# Patient Record
Sex: FEMALE | Race: WHITE | NOT HISPANIC OR LATINO | Employment: FULL TIME | URBAN - METROPOLITAN AREA
[De-identification: names, ages, dates, MRNs, and addresses within clinical notes are randomized per-mention and may not be internally consistent; named-entity substitution may affect disease eponyms.]

---

## 2017-04-03 ENCOUNTER — OFFICE VISIT (OUTPATIENT)
Dept: URGENT CARE | Facility: URGENT CARE | Age: 22
End: 2017-04-03
Payer: COMMERCIAL

## 2017-04-03 VITALS
SYSTOLIC BLOOD PRESSURE: 116 MMHG | BODY MASS INDEX: 24.79 KG/M2 | OXYGEN SATURATION: 97 % | DIASTOLIC BLOOD PRESSURE: 70 MMHG | HEART RATE: 124 BPM | WEIGHT: 149 LBS | TEMPERATURE: 98.4 F

## 2017-04-03 DIAGNOSIS — K11.20 SALIVARY GLAND INFECTION: Primary | ICD-10-CM

## 2017-04-03 PROCEDURE — 99203 OFFICE O/P NEW LOW 30 MIN: CPT | Performed by: FAMILY MEDICINE

## 2017-04-03 NOTE — MR AVS SNAPSHOT
"              After Visit Summary   4/3/2017    Dionna Barger    MRN: [id number]           Patient Information     Date Of Birth          1995        Visit Information        Provider Department      4/3/2017 7:45 PM Shital Molina MD [facility]        Today's Diagnoses     Salivary gland infection    -  1       Follow-ups after your visit        Who to contact     If you have questions or need follow up information about today's clinic visit or your schedule please contact [facility] directly at [phone].  Normal or non-critical lab and imaging results will be communicated to you by [employer], letter or phone within 4 business days after the clinic has received the results. If you do not hear from us within 7 days, please contact the clinic through [employer] or phone. If you have a critical or abnormal lab result, we will notify you by phone as soon as possible.  Submit refill requests through [employer] or call your pharmacy and they will forward the refill request to us. Please allow 3 business days for your refill to be completed.          Additional Information About Your Visit        MyChart Information     [employer] lets you send messages to your doctor, view your test results, renew your prescriptions, schedule appointments and more. To sign up, go to [email or web address] . Click on \"Log in\" on the left side of the screen, which will take you to the Welcome page. Then click on \"Sign up Now\" on the right side of the page.     You will be asked to enter the access code listed below, as well as some personal information. Please follow the directions to create your username and password.     Your access code is: [email or web address]  Expires: 2017  8:52 PM     Your access code will  in 90 days. If you need help or a new code, please call your [place] clinic or [phone].        Care EveryWhere ID     This is your Care EveryWhere ID. This " could be used by other organizations to access your [place] medical records  [id number]        Your Vitals Were     Pulse Temperature Pulse Oximetry BMI (Body Mass Index)          124 98.4  F (36.9  C) (Oral) 97% 24.79 kg/m2         Blood Pressure from Last 3 Encounters:   04/03/17 116/70   11/10/14 118/85   11/02/14 115/66    Weight from Last 3 Encounters:   04/03/17 149 lb (67.6 kg) (79 %)*   11/10/14 141 lb (64 kg) (78 %)*   10/30/14 135 lb (61.2 kg) (71 %)*     * Growth percentiles are based on [facility] 2-20 Years data.              Today, you had the following     No orders found for display         Today's Medication Changes          These changes are accurate as of: 4/3/17  8:52 PM.  If you have any questions, ask your nurse or doctor.               Start taking these medicines.        Dose/Directions    amoxicillin-clavulanate 875-125 MG per tablet   Commonly known as:  AUGMENTIN   Used for:  Salivary gland infection   Started by:  Shital Molina MD        Dose:  1 tablet   Take 1 tablet by mouth 2 times daily   Quantity:  20 tablet   Refills:  0            Where to get your medicines      Some of these will need a paper prescription and others can be bought over the counter.  Ask your nurse if you have questions.     Bring a paper prescription for each of these medications     amoxicillin-clavulanate 875-125 MG per tablet                Primary Care Provider Office Phone # Fax #    Ashleigh Guido [phone]       [facility] [address] [facility] [address]        Thank you!     Thank you for choosing [facility]  for your care. Our goal is always to provide you with excellent care. Hearing back from our patients is one way we can continue to improve our services. Please take a few minutes to complete the written survey that you may receive in the mail after your visit with us. Thank you!             Your Updated Medication List - Protect  others around you: Learn how to safely use, store and throw away your medicines at www.disposemymeds.org.          This list is accurate as of: 4/3/17  8:52 PM.  Always use your most recent med list.                   Brand Name Dispense Instructions for use    amoxicillin-clavulanate 875-125 MG per tablet    AUGMENTIN    20 tablet    Take 1 tablet by mouth 2 times daily       ondansetron 4 MG tablet    ZOFRAN    20 tablet    Take 1 tablet (4 mg) by mouth every 8 hours as needed for nausea       order for DME     1 each    Equipment being ordered: Quad Cane () Treatment Diagnosis: impaired gait       oxyCODONE 5 MG IR tablet    ROXICODONE    30 tablet    Take 1-2 tablets (5-10 mg) by mouth every 3 hours as needed for moderate to severe pain       traMADol 50 MG tablet    ULTRAM    15 tablet    Take 1 tablet (50 mg) by mouth every 6 hours as needed for moderate pain       traZODone 50 MG tablet    DESYREL    20 tablet    Take 1 tablet (50 mg) by mouth At Bedtime For insomnia.

## 2017-04-04 NOTE — PROGRESS NOTES
SUBJECTIVE:  Dionna Barger, a 21 year old female scheduled an appointment to discuss the following issues:  Salivary gland infection     She presents with rt parotid swelling with some tenderness for last 3 days   Has no systemic symptoms     Past Medical History:   Diagnosis Date     Reflux, vesicoureteral       Past Surgical History:   Procedure Laterality Date     ARTHROSCOPIC RECONSTRUCTION ANTERIOR CRUCIATE LIGAMENT Right 11/10/2014    Procedure: ARTHROSCOPIC RECONSTRUCTION ANTERIOR CRUCIATE LIGAMENT;  Surgeon: Toribio Reynaga MD;  Location: [facility] OR     C REPAIR OF URETER  8/22/08    deflux         Social History     Social History     Marital status: Single     Spouse name: N/A     Number of children: N/A     Years of education: N/A     Occupational History     Not on file.     Social History Main Topics     Smoking status: Passive Smoke Exposure - Never Smoker     Smokeless tobacco: Not on file      Comment: mom and dad smoke outside     Alcohol use Not on file     Drug use: Not on file     Sexual activity: No     Other Topics Concern     Not on file     Social History Narrative        Current Outpatient Prescriptions   Medication Sig Dispense Refill     amoxicillin-clavulanate (AUGMENTIN) 875-125 MG per tablet Take 1 tablet by mouth 2 times daily 20 tablet 0     oxyCODONE (ROXICODONE) 5 MG immediate release tablet Take 1-2 tablets (5-10 mg) by mouth every 3 hours as needed for moderate to severe pain (Patient not taking: Reported on 4/3/2017) 30 tablet 0     traMADol (ULTRAM) 50 MG tablet Take 1 tablet (50 mg) by mouth every 6 hours as needed for moderate pain (Patient not taking: Reported on 4/3/2017) 15 tablet 0     ondansetron (ZOFRAN) 4 MG tablet Take 1 tablet (4 mg) by mouth every 8 hours as needed for nausea (Patient not taking: Reported on 4/3/2017) 20 tablet 0     traZODone (DESYREL) 50 MG tablet Take 1 tablet (50 mg) by mouth At Bedtime For insomnia. (Patient not taking: Reported on 4/3/2017)  20 tablet 0     ORDER FOR DME Equipment being ordered: Quad Cane ()  Treatment Diagnosis: impaired gait (Patient not taking: Reported on 4/3/2017) 1 each 0       Health Maintenance   Topic Date Due     INFLUENZA VACCINE (SYSTEM ASSIGNED)  09/01/2017     TETANUS IMMUNIZATION (SYSTEM ASSIGNED)  12/30/2018     PEDS DTAP/TDAP (7 - Td) 12/30/2018     HPV IMMUNIZATION  Completed        ROS:  CONSTITUTIONAL:no fever, no chills or sweats, no excessive fatigue, no significant change in weight  CV: neg   RESP -neg  GI:  Neg   NEURO: neg   MSK - neg   Skin - neg   Pyschiatry-neg     OBJECTIVE:  /70 (BP Location: Left arm, Patient Position: Chair, Cuff Size: Adult Regular)  Pulse 124  Temp 98.4  F (36.9  C) (Oral)  Wt 149 lb (67.6 kg)  SpO2 97%  BMI 24.79 kg/m2      EXAM:  GENERAL APPEARANCE: healthy, alert and no distress  EYES: EOMI,  PERRL  HENT: ear canals and TM's normal and nose and mouth without ulcers but there is redness in the rt lower jaw area just at the region of the premolar tooth   There is diffuse swelling of the rt parotid gland overlying the rt lower jaw and measuring about 6 cm by 4 cm with no acute tenderness   RESP: lungs clear to auscultation - no rales, rhonchi or wheezes  CV: regular rates and rhythm, normal S1 S2, no S3 or S4 and no murmur, click or rub -  ABDOMEN:  soft, nontender, no HSM or masses and bowel sounds normal  SKIN: no suspicious lesions or rashes  LYMPHATICS: No cervical nodes       ASSESSMENT/PLAN:  Chinyere was seen today for facial swelling.    Diagnoses and all orders for this visit:    Salivary gland infection  -     amoxicillin-clavulanate (AUGMENTIN) 875-125 MG per tablet; Take 1 tablet by mouth 2 times daily      Advised her to suck on lemon drops to help salivary gland secretion and drainage and help with the swelling   Can do nsaids too      Follow up if  symptoms fail to improve or worsens   Pt understood and agreed with plan             Shital Molina MD

## 2017-04-04 NOTE — NURSING NOTE
"Chief Complaint   Patient presents with     Facial Swelling     swelling in right side of face x 2 days        Initial /70 (BP Location: Left arm, Patient Position: Chair, Cuff Size: Adult Regular)  Pulse 124  Temp 98.4  F (36.9  C) (Oral)  Wt 149 lb (67.6 kg)  SpO2 97%  BMI 24.79 kg/m2 Estimated body mass index is 24.79 kg/(m^2) as calculated from the following:    Height as of 10/30/14: 5' 5\" (1.651 m).    Weight as of this encounter: 149 lb (67.6 kg).  Medication Reconciliation: complete  "

## 2018-07-14 ENCOUNTER — HEALTH MAINTENANCE LETTER (OUTPATIENT)
Age: 23
End: 2018-07-14

## 2019-10-18 ENCOUNTER — OFFICE VISIT (OUTPATIENT)
Dept: INTERNAL MEDICINE | Facility: CLINIC | Age: 24
End: 2019-10-18
Payer: COMMERCIAL

## 2019-10-18 VITALS
HEIGHT: 65 IN | TEMPERATURE: 99.5 F | WEIGHT: 157.9 LBS | OXYGEN SATURATION: 99 % | HEART RATE: 97 BPM | DIASTOLIC BLOOD PRESSURE: 64 MMHG | SYSTOLIC BLOOD PRESSURE: 112 MMHG | BODY MASS INDEX: 26.31 KG/M2

## 2019-10-18 DIAGNOSIS — Z12.4 SCREENING FOR MALIGNANT NEOPLASM OF CERVIX: ICD-10-CM

## 2019-10-18 DIAGNOSIS — Z00.00 ROUTINE GENERAL MEDICAL EXAMINATION AT A HEALTH CARE FACILITY: Primary | ICD-10-CM

## 2019-10-18 DIAGNOSIS — Z11.1 SCREENING EXAMINATION FOR PULMONARY TUBERCULOSIS: ICD-10-CM

## 2019-10-18 DIAGNOSIS — Z11.3 SCREEN FOR STD (SEXUALLY TRANSMITTED DISEASE): ICD-10-CM

## 2019-10-18 PROCEDURE — 90715 TDAP VACCINE 7 YRS/> IM: CPT | Performed by: PHYSICIAN ASSISTANT

## 2019-10-18 PROCEDURE — 86780 TREPONEMA PALLIDUM: CPT | Performed by: PHYSICIAN ASSISTANT

## 2019-10-18 PROCEDURE — 86803 HEPATITIS C AB TEST: CPT | Performed by: PHYSICIAN ASSISTANT

## 2019-10-18 PROCEDURE — 87591 N.GONORRHOEAE DNA AMP PROB: CPT | Performed by: PHYSICIAN ASSISTANT

## 2019-10-18 PROCEDURE — 87491 CHLMYD TRACH DNA AMP PROBE: CPT | Performed by: PHYSICIAN ASSISTANT

## 2019-10-18 PROCEDURE — 86580 TB INTRADERMAL TEST: CPT | Performed by: PHYSICIAN ASSISTANT

## 2019-10-18 PROCEDURE — 36415 COLL VENOUS BLD VENIPUNCTURE: CPT | Performed by: PHYSICIAN ASSISTANT

## 2019-10-18 PROCEDURE — G0145 SCR C/V CYTO,THINLAYER,RESCR: HCPCS | Performed by: PHYSICIAN ASSISTANT

## 2019-10-18 PROCEDURE — 99385 PREV VISIT NEW AGE 18-39: CPT | Mod: 25 | Performed by: PHYSICIAN ASSISTANT

## 2019-10-18 PROCEDURE — 90471 IMMUNIZATION ADMIN: CPT | Performed by: PHYSICIAN ASSISTANT

## 2019-10-18 PROCEDURE — 87389 HIV-1 AG W/HIV-1&-2 AB AG IA: CPT | Performed by: PHYSICIAN ASSISTANT

## 2019-10-18 SDOH — HEALTH STABILITY: MENTAL HEALTH: HOW OFTEN DO YOU HAVE A DRINK CONTAINING ALCOHOL?: 2-3 TIMES A WEEK

## 2019-10-18 SDOH — HEALTH STABILITY: MENTAL HEALTH: HOW MANY STANDARD DRINKS CONTAINING ALCOHOL DO YOU HAVE ON A TYPICAL DAY?: 1 OR 2

## 2019-10-18 ASSESSMENT — MIFFLIN-ST. JEOR: SCORE: 1477.11

## 2019-10-18 NOTE — PROGRESS NOTES
SUBJECTIVE:   CC: Dionna Barger is an 24 year old woman who presents for preventive health visit.     Healthy Habits:     Getting at least 3 servings of Calcium per day:  Yes    Bi-annual eye exam:  NO    Dental care twice a year:  NO    Sleep apnea or symptoms of sleep apnea:  None    Diet:  Regular (no restrictions)    Frequency of exercise:  2-3 days/week    Duration of exercise:  15-30 minutes    Taking medications regularly:  Yes    Medication side effects:  None    PHQ-2 Total Score: 0    Additional concerns today:  No      Today's PHQ-2 Score:   PHQ-2 ( 1999 [employer]) 10/18/2019   Q1: Little interest or pleasure in doing things 0   Q2: Feeling down, depressed or hopeless 0   PHQ-2 Score 0   Q1: Little interest or pleasure in doing things Not at all   Q2: Feeling down, depressed or hopeless Not at all   PHQ-2 Score 0       Abuse: Current or Past(Physical, Sexual or Emotional)- NO  Do you feel safe in your environment? YES    Social History     Tobacco Use     Smoking status: Never Smoker     Smokeless tobacco: Never Used     Tobacco comment: mom and dad smoke outside   Substance Use Topics     Alcohol use: Yes     Frequency: 2-3 times a week     Drinks per session: 1 or 2     Comment: occ         Alcohol Use 10/18/2019   Prescreen: >3 drinks/day or >7 drinks/week? No   Prescreen: >3 drinks/day or >7 drinks/week? -   No flowsheet data found.    Reviewed orders with patient.  Reviewed health maintenance and updated orders accordingly - Yes    Mammogram not appropriate for this patient based on age.    Pertinent mammograms are reviewed under the imaging tab.  History of abnormal Pap smear: NO - age 21-29 PAP every 3 years recommended     Reviewed and updated as needed this visit by clinical staff  Tobacco  Allergies  Meds  Med Hx  Surg Hx  Fam Hx  Soc Hx        Reviewed and updated as needed this visit by Provider  Tobacco  Surg Hx  Fam Hx  Soc Hx         Review of Systems  CONSTITUTIONAL:  "NEGATIVE for fever, chills, change in weight  INTEGUMENTARU/SKIN: NEGATIVE for worrisome rashes, moles or lesions  EYES: NEGATIVE for vision changes or irritation  ENT: NEGATIVE for ear, mouth and throat problems  RESP: NEGATIVE for significant cough or SOB  BREAST: NEGATIVE for masses, tenderness or discharge  CV: NEGATIVE for chest pain, palpitations or peripheral edema  GI: NEGATIVE for nausea, abdominal pain, heartburn, or change in bowel habits  : NEGATIVE for unusual urinary or vaginal symptoms. Periods are regular.  MUSCULOSKELETAL: NEGATIVE for significant arthralgias or myalgia  NEURO: NEGATIVE for weakness, dizziness or paresthesias  PSYCHIATRIC: NEGATIVE for changes in mood or affect     OBJECTIVE:   /64   Pulse 97   Temp 99.5  F (37.5  C) (Temporal)   Ht 1.651 m (5' 5\")   Wt 71.6 kg (157 lb 14.4 oz)   LMP 09/19/2019   SpO2 99%   Breastfeeding? No   BMI 26.28 kg/m    Physical Exam  GENERAL: healthy, alert and no distress  EYES: Eyes grossly normal to inspection, PERRL and conjunctivae and sclerae normal  HENT: ear canals and TM's normal, nose and mouth without ulcers or lesions  NECK: no adenopathy, no asymmetry, masses, or scars and thyroid normal to palpation  RESP: lungs clear to auscultation - no rales, rhonchi or wheezes  CV: regular rate and rhythm, normal S1 S2, no S3 or S4, no murmur, click or rub, no peripheral edema and peripheral pulses strong  ABDOMEN: soft, nontender, no hepatosplenomegaly, no masses and bowel sounds normal   (female): normal female external genitalia, normal urethral meatus, vaginal mucosa pink, moist, well rugated, and normal cervix/adnexa/uterus without masses or discharge  MS: no gross musculoskeletal defects noted, no edema  SKIN: no suspicious lesions or rashes  NEURO: Normal strength and tone, mentation intact and speech normal  PSYCH: mentation appears normal, affect normal/bright    Diagnostic Test Results:  Labs reviewed in Epic    ASSESSMENT/PLAN: "   1. Routine general medical examination at a health care facility  - reviewed PMH and health maintenance     2. Screening for malignant neoplasm of cervix  - Pap imaged thin layer screen only - recommended age 21 - 24 years    3. Screen for STD (sexually transmitted disease)  - NEISSERIA GONORRHOEA PCR  - Treponema Abs w Reflex to RPR and Titer  - HIV Antigen Antibody Combo  - Hepatitis C Screen Reflex to HCV RNA Quant and Genotype  - CHLAMYDIA TRACHOMATIS PCR    4. Screening examination for pulmonary tuberculosis  - TB INTRADERMAL TEST    Geovanna Serrano PA-C  [facility]

## 2019-10-18 NOTE — NURSING NOTE
"Chief Complaint   Patient presents with     Physical     discuss sti screenings and pap     /64   Pulse 97   Temp 99.5  F (37.5  C) (Temporal)   Ht 1.651 m (5' 5\")   Wt 71.6 kg (157 lb 14.4 oz)   LMP 09/19/2019   SpO2 99%   Breastfeeding? No   BMI 26.28 kg/m   Estimated body mass index is 26.28 kg/m  as calculated from the following:    Height as of this encounter: 1.651 m (5' 5\").    Weight as of this encounter: 71.6 kg (157 lb 14.4 oz).        Health Maintenance due pending provider review:  Pap Smear and Chlamydia screening    PAP--Possibly completing today, per Provider review    Noris Suarez CMA  "

## 2019-10-19 LAB — T PALLIDUM AB SER QL: NONREACTIVE

## 2019-10-20 LAB
C TRACH DNA SPEC QL NAA+PROBE: NEGATIVE
N GONORRHOEA DNA SPEC QL NAA+PROBE: NEGATIVE
SPECIMEN SOURCE: NORMAL
SPECIMEN SOURCE: NORMAL

## 2019-10-21 ENCOUNTER — ALLIED HEALTH/NURSE VISIT (OUTPATIENT)
Dept: NURSING | Facility: CLINIC | Age: 24
End: 2019-10-21
Payer: COMMERCIAL

## 2019-10-21 DIAGNOSIS — Z11.1 SCREENING EXAMINATION FOR PULMONARY TUBERCULOSIS: Primary | ICD-10-CM

## 2019-10-21 LAB
HCV AB SERPL QL IA: NONREACTIVE
HIV 1+2 AB+HIV1 P24 AG SERPL QL IA: NONREACTIVE
PPDINDURATION: 0 MM (ref 0–5)
PPDREDNESS: 0 MM

## 2019-10-21 NOTE — PROGRESS NOTES
Mantoux results: No induration.  No swelling.  No redness.    Copy of results given to patient.    Shiela Hughes RN on 10/21/2019 at 9:21 AM

## 2019-10-22 LAB
COPATH REPORT: NORMAL
PAP: NORMAL

## 2020-09-09 ENCOUNTER — OFFICE VISIT (OUTPATIENT)
Dept: INTERNAL MEDICINE | Facility: CLINIC | Age: 25
End: 2020-09-09
Payer: COMMERCIAL

## 2020-09-09 VITALS
BODY MASS INDEX: 24.68 KG/M2 | HEIGHT: 66 IN | SYSTOLIC BLOOD PRESSURE: 114 MMHG | HEART RATE: 94 BPM | WEIGHT: 153.6 LBS | RESPIRATION RATE: 16 BRPM | OXYGEN SATURATION: 99 % | TEMPERATURE: 98.6 F | DIASTOLIC BLOOD PRESSURE: 60 MMHG

## 2020-09-09 DIAGNOSIS — Z32.00 PREGNANCY EXAMINATION OR TEST, PREGNANCY UNCONFIRMED: Primary | ICD-10-CM

## 2020-09-09 LAB — B-HCG SERPL-ACNC: 114 IU/L (ref 0–5)

## 2020-09-09 PROCEDURE — 84702 CHORIONIC GONADOTROPIN TEST: CPT | Performed by: PHYSICIAN ASSISTANT

## 2020-09-09 PROCEDURE — 99213 OFFICE O/P EST LOW 20 MIN: CPT | Performed by: PHYSICIAN ASSISTANT

## 2020-09-09 PROCEDURE — 36415 COLL VENOUS BLD VENIPUNCTURE: CPT | Performed by: PHYSICIAN ASSISTANT

## 2020-09-09 ASSESSMENT — MIFFLIN-ST. JEOR: SCORE: 1472.45

## 2020-09-09 NOTE — PROGRESS NOTES
"Subjective     Dionna Barger is a 25 year old female who presents to clinic today for the following health issues:    HPI       Patient just wants to confirm pregnancy. She has had multiple positive test results at home. She notes she is excited and plans to continue with pregnancy. She has had no previous pregnancies.   LMP: August 11th   - taking a prenatal   - a cup of caffeine daily  - no alcohol or tobacco use         Objective    /60   Pulse 94   Temp 98.6  F (37  C) (Tympanic)   Resp 16   Ht 1.683 m (5' 6.25\")   Wt 69.7 kg (153 lb 9.6 oz)   LMP 08/11/2020   SpO2 99%   BMI 24.60 kg/m    Body mass index is 24.6 kg/m .  Physical Exam   GENERAL: healthy, alert and no distress    No results found for this or any previous visit (from the past 24 hour(s)).        Assessment & Plan     Pregnancy examination or test, pregnancy unconfirmed  - pt requests blood test, will order for confirmation   - reviewed pregnancy education and guidelines   - referral to OB as below   - HCG quantitative pregnancy  - OB/GYN REFERRAL     No follow-ups on file.    Geovanna Serrano PA-C  [facility]    "

## 2020-10-06 DIAGNOSIS — O36.80X0 PREGNANCY WITH INCONCLUSIVE FETAL VIABILITY: Primary | ICD-10-CM

## 2020-10-06 NOTE — PROGRESS NOTES
Pt has NOB scheduled with ultrasound. Needed order to be placed. Future ultrasound order placed and linked with appt. Closing encounter.   Jazzmine Maldonado RN on 10/6/2020 at 4:28 PM

## 2020-10-07 ENCOUNTER — PRENATAL OFFICE VISIT (OUTPATIENT)
Dept: NURSING | Facility: CLINIC | Age: 25
End: 2020-10-07
Payer: COMMERCIAL

## 2020-10-07 DIAGNOSIS — Z13.79 GENETIC SCREENING: Primary | ICD-10-CM

## 2020-10-07 DIAGNOSIS — Z34.01 ENCOUNTER FOR SUPERVISION OF NORMAL FIRST PREGNANCY IN FIRST TRIMESTER: ICD-10-CM

## 2020-10-07 PROBLEM — Z34.00 SUPERVISION OF NORMAL FIRST PREGNANCY: Status: ACTIVE | Noted: 2020-10-07

## 2020-10-07 PROCEDURE — 99207 PR NO CHARGE NURSE ONLY: CPT

## 2020-10-07 RX ORDER — VITAMIN A ACETATE, .BETA.-CAROTENE, ASCORBIC ACID, CHOLECALCIFEROL, .ALPHA.-TOCOPHEROL ACETATE, DL-, THIAMINE MONONITRATE, RIBOFLAVIN, NIACINAMIDE, PYRIDOXINE HYDROCHLORIDE, FOLIC ACID, CYANOCOBALAMIN, CALCIUM CARBONATE, FERROUS FUMARATE, ZINC OXIDE, AND CUPRIC OXIDE 2000; 2000; 120; 400; 22; 1.84; 3; 20; 10; 1; 12; 200; 27; 25; 2 [IU]/1; [IU]/1; MG/1; [IU]/1; MG/1; MG/1; MG/1; MG/1; MG/1; MG/1; UG/1; MG/1; MG/1; MG/1; MG/1
1 TABLET ORAL DAILY
COMMUNITY

## 2020-10-07 NOTE — PROGRESS NOTES
SUBJECTIVE:     HPI:    This is a 25 year old female patient,  who presents for her first obstetrical visit.    KARLA: 2021, by Last Menstrual Period.  She is 8w1d weeks at the time of the nurse visit on 10/7/20.  Her cycles are regular.  Her last menstrual period was normal.   Since her LMP, she has experienced  nausea and cramping and bloating).   She denies emesis, abdominal pain, fatigue, headache, loss of appetite, vaginal discharge, dysuria, pelvic pain, urinary urgency, lightheadedness, urinary frequency, vaginal bleeding, hemorrhoids and constipation.    Additional History: First pregnancy      Have you travelled during the pregnancy?No  Have your sexual partner(s) travelled during the pregnancy?No      HISTORY:   Planned Pregnancy: No, but excited  Marital Status: Single  Occupation:  [employer]  Living in Household: Significant Other and Parents/Siblings.  Lives with Jt' parents    Past History:  Her past medical history is non-contributory.      She has a history of  none    Since her last LMP she denies use of alcohol, tobacco and street drugs.    Past medical, surgical, social and family history were reviewed and updated in [employer].        Current Outpatient Medications   Medication     Prenatal Vit-Fe Fumarate-FA (PNV PRENATAL PLUS MULTIVITAMIN) 27-1 MG TABS per tablet     No current facility-administered medications for this visit.        ROS:   12 point review of systems negative other than symptoms noted below or in the HPI.  Gastrointestinal: Bloating and Nausea  Genitourinary: Cramps     Nurse phone visit completed. Prenatal book and folder (containing standard educational hand-outs and brochures) sent to patient. Information in folder reviewed over the phone. Questions answered. Brochure given on optional screening available to assess chromosomal anomalies. Pt desires NIPT. Pt advised to call the clinic if she has any questions or concerns related to her pregnancy. Prenatal  labs future ordered. New prenatal visit scheduled on 10/21/20 with Kandy Barfield CNM.      Lab Results   Component Value Date    PAP NIL 10/18/2019     PHQ-9 score:    PHQ 10/6/2020   PHQ-9 Total Score 0   Q9: Thoughts of better off dead/self-harm past 2 weeks Not at all     VENITA-7 SCORE 10/6/2020   Total Score 0 (minimal anxiety)   Total Score 0   Sri Goff RN on 10/7/2020 at 1:21 PM    [employer]

## 2020-10-19 NOTE — PATIENT INSTRUCTIONS
Thank you for coming to see the Midwives at the   [facility]!      Please take prenatal vitamin with DHA and vitamin D3 4,000-5,000 international unit(s) once daily during the entire pregnancy.      We will notify you about your labs that were drawn today once we get the results back.  If you have MyChart your lab results will be posted there.      Someone from the clinic will call you personally or send you a StyleSeek message with your results.      If you need any refills of medications please call your pharmacy and they will contact us.      If you have a medical emergency please call 911.      If you have any concerns about today's visit, wish to schedule another appointment, or have an urgent medical concern please call our office at [phone]. You can also make appointments through StyleSeek.      After hours you may also call the clinic number above to be connected with [place]'s after hours triage nurse.  The nurse can page the midwife on call if needed. There is always a midwife on call 24 hours a day.    Prenatal Care Recommendations:    Before 14 weeks: Dating ultrasound, genetic testing       This ultrasound helps us determine your dates accurately. Innatal (genetic screening test) can be drawn anytime after 10 weeks of gestation.    16 weeks: Optional genetic testing single AFP       This testing helps understand your baby's risk for some genetic abnormalities.    18-22 weeks:  Screening anatomy ultrasound       This testing will look for early growth abnormalities, placenta location, and may tell the baby's gender if you wish to find out.    24-27 weeks: One hour diabetes test (GCT) and complete blood count       This test helps identify diabetes of pregnancy or gestational diabetes.  We also look   at the iron in your blood and how well your blood clots.    28 - 36 weeks: Tetanus shot (Tdap)       This shot helps protect you and your baby from whooping cough.    36 weeks and  later: Group B Strep test (GBS)       This test helps predict if you need antibiotics in labor to prevent infection for your baby.    Anytime September to April:  Flu shot       This shot helps protect you and your family from the flu.  This is especially important during pregnancy.        The typical schedule after your first visit today you can expect:     Visit 2 - 12-16 weeks  Visit 3 - 20 weeks  Visit 4 - 24 weeks  Visit 5 - 28 weeks  Visit 6 - 30 weeks  Visit 7 - 32 weeks  Visit 8 - 34 weeks  Visit 9 - 36 weeks  Weekly after 36 weeks until delivery.        Any time during or after your pregnancy you may experience increased depression and/or mood changes.    We are here to support you. Please contact us if you are:    Feeling anxious    Overwhelmed or sad     Trouble sleeping    Crying uncontrollably    Trouble caring for yourself or baby.    Any thoughts of hurting yourself, your baby, or anyone else    If anything comes up between your visits or you have concerns please don't hesitate to contact us.    Secure access to your medical record:  Use [employer] (secure email communication and access to your chart) to send your primary care provider a message or make an appointment. Ask someone on your Team how to sign up for [employer]. To log on to [employer] or for more information in [employer] please visit the website at [email or web address].       Certified Nurse Midwife (CNM) Team    EJ Ngo CNM Melissa Kitzman APRN, CNM, [facility]  BOBO Hunt DNP, BOOB Benson DNP, EJ      Again, thank you for choosing the midwives at [facility].  We are excited to be a part of your pregnancy. Please let us know how we can best partner with you to improve your and your family's health.    Remedies for nausea and vomiting with pregnancy      Eat small frequent meals every 2-3 hours if possible.       Avoid food at extremes of temperature and  drinks with carbonation.      Eat foods that appeal to you, avoiding fats and spicy foods.      Avoid liquids with foods.  Drink liquids 30-60 minutes before or after eating and sip slowly.      Bread, pasta, crackers, potatoes, and rice tend to be tolerated the best.      Don't worry about what you eat in the first 3 months, it is more important that you can eat and keep it down.       Try flat ginger ale or ginger tea      Before rising in the morning, eat a small amount of crackers or dry toast.      Peppermint tea      Ginger is a herbal remedy for nausea and you can use it in any form.  There are ginger tablets you can purchase.  The dose 1000 mg a day in divided doses.       You may also try doxylamine (Unisom) 12.5 mg three times a day which is a sleeping medication along with Vitamin B6 25 mg three times a day.  This combination takes up to a week to work so give it some time.       Benadryl (diphenhydramine) 25-50 mg every 8 hour or Dramamine (dimenhydrinate)  mg by mouth every 4-6 hours. Both of these medication may cause some drowsiness      Other things that may help include an Accupressure band and acupuncture      If these methods fail there are many prescriptions that we can try    If you begin to vomit more than 5 or 6 times a day and feel that you are unable to keep anything down, call the [facility] at [phone]    Recommendations for total and rate of weight gain during pregnancy, by prepregnancy BMI    Total weight gain Rates of weight gain*  2nd and 3rd trimester   Prepregnancy BMI Range in kg Range in lbs Mean (range) in kg/week Mean (range) in lbs/week   Underweight (<18.5 kg/m2) 12.5-18 28-40 0.51 (0.44-0.58) 1 (1-1.3)   Normal weight (18.5-24.9 kg/m2) 11.5-16 25-35 0.42 (0.35-0.50) 1 (0.8-1)   Overweight (25.0-29.9 kg/m2) 7-11.5 15-25 0.28 (0.23-0.33) 0.6 (0.5-0.7)   Obese (?30.0 kg/m2) 5-9 11-20 0.22 (0.17-0.27) 0.5 (0.4-0.6)   BMI: body mass index.  *  Calculations assume a 0.5-2 kg (1.1-4.4 lbs) weight gain in the first trimester.  * To calculate BMI go to [email or web address]      Constipation    Constipation can be caused by many factors such as poor diet, lack of activity, and medications. Often times women experience more constipation during pregnancy due to decreased intestinal motility.    DRINK TONS OF WATER! 2.5 liters (4 pints) of water per day      Activity is very important. Increase your daily activity to at least 20-60 minutes. This increases blood flow to your gut and improves bowel function    Limit caffeine and alcohol intake    Avoid foods you've identified as constipating    Increase fiber intake: there are ways to increase you fiber through your diet but there are also OTC agents such as Metamucil or Benfiber that you can supplement your diet with    Use probiotics such as Florastor and/or prebiotics such as oligosaccharides    Consider using an Omega 3 supplement, flaxseed and amador seeds are great sources    Plan adequate time for elimination, it is most effective right after activity, and it may be beneficial to plan a consistent time daily    Food Suggestions      Eat beans, nuts, whole grain breads and cereals, oats, barley, figs, apples with skin, raisins, green leafy vegetables, fresh and dried fruits (especially grapes), prunes or prune juice    Eat popcorn nightly    Eat 2-3 salads per day    Add a pinch of cayenne pepper to food    1-2 tbsp of olive oil per day    Lemon juice and/or honey in warm water every morning before breakfast    Decrease red meat, refined white flour products like white rice, white bread and pasta    Tea infusions of: rosemary, chamomile, lemon balm, senna leaf, alfalfa, fennel seeds, lavender, cascara, dandelion, licorice root tea, psyllium seeds, marshmallow root    Other remedies      Increase Vitamin B and E (800 IU if not pregnant, 400 IU if pregnant per day) and potassium, calcium, and magnesium  supplements      If these remedies fail, medications are an option as well. Please talk with your midwife if you continue to struggle with constipation. If you are pregnant please double check with us before starting any medications!

## 2020-10-21 ENCOUNTER — PRENATAL OFFICE VISIT (OUTPATIENT)
Dept: MIDWIFE SERVICES | Facility: CLINIC | Age: 25
End: 2020-10-21
Payer: COMMERCIAL

## 2020-10-21 ENCOUNTER — ANCILLARY PROCEDURE (OUTPATIENT)
Dept: ULTRASOUND IMAGING | Facility: CLINIC | Age: 25
End: 2020-10-21
Payer: COMMERCIAL

## 2020-10-21 VITALS
HEIGHT: 66 IN | WEIGHT: 158.8 LBS | BODY MASS INDEX: 25.52 KG/M2 | SYSTOLIC BLOOD PRESSURE: 110 MMHG | DIASTOLIC BLOOD PRESSURE: 60 MMHG | HEART RATE: 92 BPM

## 2020-10-21 DIAGNOSIS — Z34.01 ENCOUNTER FOR SUPERVISION OF NORMAL FIRST PREGNANCY IN FIRST TRIMESTER: Primary | ICD-10-CM

## 2020-10-21 DIAGNOSIS — Z13.79 GENETIC SCREENING: ICD-10-CM

## 2020-10-21 DIAGNOSIS — O36.80X0 PREGNANCY WITH INCONCLUSIVE FETAL VIABILITY: ICD-10-CM

## 2020-10-21 LAB
ABO + RH BLD: NORMAL
ABO + RH BLD: NORMAL
ALBUMIN UR-MCNC: NEGATIVE MG/DL
APPEARANCE UR: CLEAR
BILIRUB UR QL STRIP: NEGATIVE
BLD GP AB SCN SERPL QL: NORMAL
BLOOD BANK CMNT PATIENT-IMP: NORMAL
COLOR UR AUTO: YELLOW
ERYTHROCYTE [DISTWIDTH] IN BLOOD BY AUTOMATED COUNT: 14.1 % (ref 10–15)
GLUCOSE UR STRIP-MCNC: NEGATIVE MG/DL
HBV SURFACE AG SERPL QL IA: NONREACTIVE
HCT VFR BLD AUTO: 37.2 % (ref 35–47)
HGB BLD-MCNC: 12.5 G/DL (ref 11.7–15.7)
HGB UR QL STRIP: NEGATIVE
HIV 1+2 AB+HIV1 P24 AG SERPL QL IA: NONREACTIVE
KETONES UR STRIP-MCNC: NEGATIVE MG/DL
LEUKOCYTE ESTERASE UR QL STRIP: NEGATIVE
MCH RBC QN AUTO: 28.2 PG (ref 26.5–33)
MCHC RBC AUTO-ENTMCNC: 33.6 G/DL (ref 31.5–36.5)
MCV RBC AUTO: 84 FL (ref 78–100)
NITRATE UR QL: NEGATIVE
PH UR STRIP: 7 PH (ref 5–7)
PLATELET # BLD AUTO: 216 10E9/L (ref 150–450)
RBC # BLD AUTO: 4.43 10E12/L (ref 3.8–5.2)
SOURCE: NORMAL
SP GR UR STRIP: 1.01 (ref 1–1.03)
SPECIMEN EXP DATE BLD: NORMAL
UROBILINOGEN UR STRIP-ACNC: 0.2 EU/DL (ref 0.2–1)
WBC # BLD AUTO: 6.5 10E9/L (ref 4–11)

## 2020-10-21 PROCEDURE — 86901 BLOOD TYPING SEROLOGIC RH(D): CPT | Performed by: ADVANCED PRACTICE MIDWIFE

## 2020-10-21 PROCEDURE — 76817 TRANSVAGINAL US OBSTETRIC: CPT | Performed by: OBSTETRICS & GYNECOLOGY

## 2020-10-21 PROCEDURE — 86900 BLOOD TYPING SEROLOGIC ABO: CPT | Performed by: ADVANCED PRACTICE MIDWIFE

## 2020-10-21 PROCEDURE — 99000 SPECIMEN HANDLING OFFICE-LAB: CPT | Performed by: ADVANCED PRACTICE MIDWIFE

## 2020-10-21 PROCEDURE — 87340 HEPATITIS B SURFACE AG IA: CPT | Performed by: ADVANCED PRACTICE MIDWIFE

## 2020-10-21 PROCEDURE — 87389 HIV-1 AG W/HIV-1&-2 AB AG IA: CPT | Performed by: ADVANCED PRACTICE MIDWIFE

## 2020-10-21 PROCEDURE — 86850 RBC ANTIBODY SCREEN: CPT | Performed by: ADVANCED PRACTICE MIDWIFE

## 2020-10-21 PROCEDURE — 99207 PR FIRST OB VISIT: CPT | Performed by: ADVANCED PRACTICE MIDWIFE

## 2020-10-21 PROCEDURE — 86762 RUBELLA ANTIBODY: CPT | Performed by: ADVANCED PRACTICE MIDWIFE

## 2020-10-21 PROCEDURE — 36415 COLL VENOUS BLD VENIPUNCTURE: CPT | Performed by: ADVANCED PRACTICE MIDWIFE

## 2020-10-21 PROCEDURE — 99N1100 PR STATISTIC VERIFI PRENATAL TRISOMY 21,18,13: Mod: 90 | Performed by: ADVANCED PRACTICE MIDWIFE

## 2020-10-21 PROCEDURE — 86780 TREPONEMA PALLIDUM: CPT | Mod: 90 | Performed by: ADVANCED PRACTICE MIDWIFE

## 2020-10-21 PROCEDURE — 81003 URINALYSIS AUTO W/O SCOPE: CPT | Performed by: ADVANCED PRACTICE MIDWIFE

## 2020-10-21 PROCEDURE — 85027 COMPLETE CBC AUTOMATED: CPT | Performed by: ADVANCED PRACTICE MIDWIFE

## 2020-10-21 PROCEDURE — 87088 URINE BACTERIA CULTURE: CPT | Performed by: ADVANCED PRACTICE MIDWIFE

## 2020-10-21 PROCEDURE — 87186 SC STD MICRODIL/AGAR DIL: CPT | Performed by: ADVANCED PRACTICE MIDWIFE

## 2020-10-21 PROCEDURE — 87086 URINE CULTURE/COLONY COUNT: CPT | Performed by: ADVANCED PRACTICE MIDWIFE

## 2020-10-21 ASSESSMENT — MIFFLIN-ST. JEOR: SCORE: 1496.03

## 2020-10-21 NOTE — PROGRESS NOTES
SUBJECTIVE:      HPI:     This is a 25 year old female patient,  who presents for her first obstetrical visit.     KARLA: 2021, by Last Menstrual Period.  She is 8w1d weeks at the time of the nurse visit on 10/7/20.  Her cycles are regular.  Her last menstrual period was normal.   Since her LMP, she has experienced  nausea and cramping and bloating).   She denies emesis, abdominal pain, fatigue, headache, loss of appetite, vaginal discharge, dysuria, pelvic pain, urinary urgency, lightheadedness, urinary frequency, vaginal bleeding, hemorrhoids and constipation.     Additional History: First pregnancy        Have you travelled during the pregnancy?No  Have your sexual partner(s) travelled during the pregnancy?No        HISTORY:   Planned Pregnancy: No, but excited  Marital Status: Single  Occupation:  [employer]  Living in Household: Significant Other and Parents/Siblings.  Lives with Jt' parents     Past History:  Her past medical history is non-contributory.       She has a history of  none     Since her last LMP she denies use of alcohol, tobacco and street drugs.     Past medical, surgical, social and family history were reviewed and updated in EPIC.               Current Outpatient Medications   Medication     Prenatal Vit-Fe Fumarate-FA (PNV PRENATAL PLUS MULTIVITAMIN) 27-1 MG TABS per tablet      No current facility-administered medications for this visit.          ROS:   12 point review of systems negative other than symptoms noted below or in the HPI.  Gastrointestinal: Bloating and Nausea  Genitourinary: Cramps      Nurse phone visit completed. Prenatal book and folder (containing standard educational hand-outs and brochures) sent to patient. Information in folder reviewed over the phone. Questions answered. Brochure given on optional screening available to assess chromosomal anomalies. Pt desires NIPT. Pt advised to call the clinic if she has any questions or concerns related  "to her pregnancy. Prenatal labs future ordered. New prenatal visit scheduled on 10/21/20 with Kandy Barfield CNM.              Lab Results   Component Value Date     PAP NIL 10/18/2019      PHQ-9 score:    PHQ   10/6/2020   PHQ-9 Total Score 0   Q9: Thoughts of better off dead/self-harm past 2 weeks Not at all      VENITA-7 SCORE 10/6/2020   Total Score 0 (minimal anxiety)   Total Score 0   Sri Goff RN on 10/7/2020 at 1:21 PM                OBJECTIVE:     EXAM:  /60   Pulse 92   Ht 1.683 m (5' 6.25\")   Wt 72 kg (158 lb 12.8 oz)   LMP 2020   BMI 25.44 kg/m   Body mass index is 25.44 kg/m .    GENERAL: healthy, alert and no distress  NECK: no adenopathy, no asymmetry, masses, or scars and thyroid normal to palpation  RESP: lungs clear to auscultation - no rales, rhonchi or wheezes  CV: regular rate and rhythm, normal S1 S2, no S3 or S4, no murmur, click or rub, no peripheral edema and peripheral pulses strong  ABDOMEN: soft, nontender, no hepatosplenomegaly, no masses and bowel sounds normal  MS: no gross musculoskeletal defects noted, no edema  SKIN: no suspicious lesions or rashes  NEURO: Normal strength and tone, mentation intact and speech normal  PSYCH: mentation appears normal, affect normal/bright    ASSESSMENT/PLAN:       ICD-10-CM    1. Encounter for supervision of normal first pregnancy in first trimester  Z34.01 *UA reflex to Microscopic     Urine Culture Aerobic Bacterial     Treponema Abs w Reflex to RPR and Titer     Rubella Antibody IgG Quantitative     HIV Antigen Antibody Combo     CBC with platelets     Hepatitis B surface antigen     ABO/Rh type and screen   2. Genetic screening  Z13.79 Non Invasive Prenatal Test Cell Free DNA       25 year old , On 2020 patient is 10w1d weeks of pregnancy with KARLA of 2021, by Last Menstrual Period     consult for US for AMA patients: NA  Genetic Testing reviewed and discussed, patient desires. Will have " drawn today with rest of labs.     COUNSELING    Instructed on use of triage nurse line and contacting the on call CNM after hours in an emergency.     Symptoms of N&V and fatigue usually start to resolve around 12-16 weeks     Reviewed CNM philosophy, call schedule for labor and delivery, and FSH for delivery    1st OB handout given outlining appointment spacing and CNM information    Reviewed exercise and nutrition    Recommend to gain 25-35 pounds with her pregnancy.    Discussed OTC medications. OB med list given    Encouraged patient to take PNV's/DHA    Travel precautions discussed, no air travel after 36 weeks     Will call patient with lab results when available    Does patient desire a RN home visit from the [facility]?  No    If yes, paperwork completed?  No     F/U to be addressed next visit:  Offer gc/ct    Will return to the clinic in 4 weeks for her next routine prenatal check (virtual or in person per Edi's preference.  Will call to be seen sooner if problems arise.    BOBO Small, CNM

## 2020-10-22 LAB
BACTERIA SPEC CULT: ABNORMAL
Lab: ABNORMAL
RUBV IGG SERPL IA-ACNC: 5 IU/ML
SPECIMEN SOURCE: ABNORMAL
T PALLIDUM AB SER QL: NONREACTIVE

## 2020-10-22 NOTE — RESULT ENCOUNTER NOTE
Asymptomatic bacteruria, awaiting sensitivities and will send antibitoics.     Kandy Barfield, BOBO, CNM

## 2020-10-23 DIAGNOSIS — O23.41 URINARY TRACT INFECTION IN MOTHER DURING FIRST TRIMESTER OF PREGNANCY: Primary | ICD-10-CM

## 2020-10-23 RX ORDER — CEPHALEXIN 500 MG/1
500 CAPSULE ORAL 2 TIMES DAILY
Qty: 10 CAPSULE | Refills: 0 | Status: SHIPPED | OUTPATIENT
Start: 2020-10-23 | End: 2020-10-28

## 2020-10-23 NOTE — RESULT ENCOUNTER NOTE
Informed via MyChart. Asymptomatic bacteruria, sensitive to cephalosporins will send Rx for Keflex 5 days. Also rubella non-immune, will need MMR postpartum.     Kandy Barfield, BOBO, CNM

## 2020-10-25 LAB — LAB SCANNED RESULT: NORMAL

## 2020-10-27 ENCOUNTER — TELEPHONE (OUTPATIENT)
Dept: MIDWIFE SERVICES | Facility: CLINIC | Age: 25
End: 2020-10-27

## 2020-10-27 NOTE — TELEPHONE ENCOUNTER
Innatal results: Negative  Fetal Fraction: 7 %  TEST RESULT INTERPRETATION   Chromosome 21 No aneuploidy detected  Results consistent with two copies of chromosome 21   Chromosome 18 No aneuploidy detected  Results consistent with two copies of chromosome 18   Chromosome 13 No aneuploidy detected  Results consistent with two copies of chromosome 13   Sex Chromosome No aneuploidy detected  Results consistent with two sex chromosomes:  female     Results received from [facility].    Testing done:  Innatal Prenatal Screen    Action:  Detailed vm left with negative results. Call and ask to speak to nurse with any questions or interested in the gender.    Jazzmine Maldonado RN

## 2020-10-28 NOTE — TELEPHONE ENCOUNTER
Gender results given to maternal grandmother per patient request.    Margoth Duncan RN on 10/28/2020 at 8:33 AM

## 2020-11-16 NOTE — PROGRESS NOTES
Patient feels well overall no concerns today.   Had E. Coli UTI at new OB, took full course of antibiotics. No symptoms today   Patient has had nausea and has had random vomiting, only 4-5x this pregnancy.   Educated about diet, exercise and normal weight gain. Has lost 7lb since last visit. States she is not vomiting but feels that she does not have much of an appetite so is probably not getting the recommended calorie intake.   Normal to feel movement between 18-22 weeks  Reviewed labs from 1st OB  Declines GC/Chlamydia urine collection today  Declines AFP  Discussed genetic screening; patient has had - negative innatal, having a girl!   Warning signs discussed  Discussed appetite, nutrition and weight gain. Reviewed ways to ensure 3 meals a day plus healthy snacks. Can also try adding Boost/Ensure for extra calories/protein. Also discussed proper hydration.   Clinic and hospital visitor policy discussed. Edi states she is looking into care/delivery at a birth center. Her partner's sister is a  and recommended it to her as an option. She has not contacted any birth centers yet. We discussed that I cannot speak to their current visitor restrictions in the clinic or birth center setting. I advised Edi she reach out to local birth centers for more information about transferring her care and their current COVID policies.  UA/UC for DO done today  Anatomy US order placed  Return to clinic 6 weeks for routine visit and anatomy US    BOBO Small, EJ

## 2020-11-19 ENCOUNTER — PRENATAL OFFICE VISIT (OUTPATIENT)
Dept: MIDWIFE SERVICES | Facility: CLINIC | Age: 25
End: 2020-11-19
Payer: COMMERCIAL

## 2020-11-19 VITALS — WEIGHT: 151 LBS | DIASTOLIC BLOOD PRESSURE: 62 MMHG | BODY MASS INDEX: 24.19 KG/M2 | SYSTOLIC BLOOD PRESSURE: 122 MMHG

## 2020-11-19 DIAGNOSIS — Z34.02 ENCOUNTER FOR SUPERVISION OF NORMAL FIRST PREGNANCY IN SECOND TRIMESTER: Primary | ICD-10-CM

## 2020-11-19 DIAGNOSIS — O23.41 URINARY TRACT INFECTION IN MOTHER DURING FIRST TRIMESTER OF PREGNANCY: ICD-10-CM

## 2020-11-19 DIAGNOSIS — Z36.89 ENCOUNTER FOR FETAL ANATOMIC SURVEY: ICD-10-CM

## 2020-11-19 LAB
ALBUMIN UR-MCNC: NEGATIVE MG/DL
APPEARANCE UR: CLEAR
BACTERIA #/AREA URNS HPF: ABNORMAL /HPF
BILIRUB UR QL STRIP: ABNORMAL
COLOR UR AUTO: YELLOW
GLUCOSE UR STRIP-MCNC: NEGATIVE MG/DL
HGB UR QL STRIP: ABNORMAL
KETONES UR STRIP-MCNC: 40 MG/DL
LEUKOCYTE ESTERASE UR QL STRIP: ABNORMAL
MUCOUS THREADS #/AREA URNS LPF: PRESENT /LPF
NITRATE UR QL: NEGATIVE
NON-SQ EPI CELLS #/AREA URNS LPF: ABNORMAL /LPF
PH UR STRIP: 6.5 PH (ref 5–7)
RBC #/AREA URNS AUTO: ABNORMAL /HPF
SOURCE: ABNORMAL
SP GR UR STRIP: 1.02 (ref 1–1.03)
UROBILINOGEN UR STRIP-ACNC: 1 EU/DL (ref 0.2–1)
WBC #/AREA URNS AUTO: ABNORMAL /HPF

## 2020-11-19 PROCEDURE — 81001 URINALYSIS AUTO W/SCOPE: CPT | Performed by: ADVANCED PRACTICE MIDWIFE

## 2020-11-19 PROCEDURE — 99207 PR PRENATAL VISIT: CPT | Performed by: ADVANCED PRACTICE MIDWIFE

## 2020-11-19 PROCEDURE — 87086 URINE CULTURE/COLONY COUNT: CPT | Performed by: ADVANCED PRACTICE MIDWIFE

## 2020-11-20 LAB
BACTERIA SPEC CULT: NORMAL
Lab: NORMAL
SPECIMEN SOURCE: NORMAL

## 2020-11-20 NOTE — RESULT ENCOUNTER NOTE
Cameron Daley,    Your urine culture did not show a urinary tract infection (good news!). Please contact us with any questions or concerns.     Thank you,  Shady Gaytan, ALESSANDRA, APRN, CNM

## 2020-12-27 NOTE — PATIENT INSTRUCTIONS
Round Ligament Pain    Pregnancy can entail many normal discomforts. One of those discomforts may be round ligament pain. Round ligament pain occurs as your uterus and your baby grow and the muscles begin to stretch more in your abdomen. Round ligament pain is normal and not dangerous but can be very uncomfortable      Round ligament pain is typically described as an unpleasant sensation that ranges from a sharp knifelike pain to dull intermittent pain in the lower abdominal/suprapubic area of a pregnant woman      Virtually all pregnant women will experience this pain at some point during their pregnancies      It typically manifests between 16 and 20 weeks of pregnancy and can be incredibly bothersome especially for women who remain very active during their pregnancies       Please discuss with your midwife if you are having this type of pain; sometimes the pain can be associated with other medical conditions so it is important for us to assess you just to make sure    Comfort measures    There are certain things you can do to cope with round ligament pain and ease the discomfort you are having      Pregnancy support belt      Tylenol      Hot/ice packs      Baths       Exercise such as yoga and swimming that help stretch muscles      Prenatal massage      Reflexology to waist and pelvic joints       Positioning such as side-lying and hands and knees, make sure your abdomen is  well supported with pillows while doing different positions    Calcium Rich Foods    All premenopausal or women of child-bearing age need to get at least 1000 mg of calcium per day from diet and/or supplementation. Post menopausal women should get at least 1200 mg from diet and/or supplementation.    Food     Amount   Calcium (mg)  Dairy  Yogurt     1 cup   400   Ice Cream/Frozen yogurt 1/2 cup  100  Milk 1% or 2%   1 cup   300  Cheese   1 oz    195-335   Cottage cheese 2%  1 cup   155  Fruits  Orange   1 medium  60  Pear    1  medium  19  Raisins    1/4 cup  18  Vegetables-fresh and/or cooked  Broccoli   1/2 cup  36  Bok So   1/2 cup  79  Petra greens   1/2 cup  15  Carrots    1 medium  19  Iceberg lettuce  4 leaves  16  Nuts, Beans, Seeds  Canned baked beans   1/2 cup  100  Canned red kidney beans 1/2 cup  25-80  Canned navy beans  1/2 cup  64  Tofu with calcium sulfate  1/2 cup  150  Soybeans   1 cup   175  Soy milk    1 cup   10  Almonds    1/4 cup  74  Protein  [place]   3 oz   181  Tuna, canned   3 oz   10  Turkey breast   3.5 oz   10  Egg (large)   1 egg   27  Peanut Butter   2 tbsp   11  Beef     3 oz   9  Chicken   1 leg   15  Grain  Amaranth (uncooked)  1 cup   298  Corn tortilla    1 medium  42  Rice (cooked)   1/2 cup  20  Waffle (frozen ~7in)  1   179  Bagel    1   23  Calcium fortified foods/drinks  Orange juice   1 cup   300  Cereal + milk   3/4 cup + 1/2 cup 400  Rice milk   1 cup   300  Lactaid milk    1 cup       GESTATIONAL DIABETES SCREENING    All pregnant women are screened at least once for diabetes as part of their prenatal care. A woman has gestational diabetes if she has high blood sugars for the first time during pregnancy.      Diabetes can harm your health and the health of your baby.  But if we find the diabetes early in pregnancy we will watch your health closely and prevent further problems.       We will check for gestational diabetes during your visit between 24-28 weeks visit. Please note you can not do this prior to 24 weeks of gestation.      Plan to spend about an hour at the clinic.  When you check in let us know that you will be having your diabetes screening that day.       We will give you a 50 gram glucose drink that you have 5 minutes to consume.  Exactly one hour later you will have draw blood from your arm to check your blood sugar level.      We will call you to let you know if your results are normal.  If the results are normal no more testing will be needed.  If your results are not  normal we will discuss follow up testing with you.        You may eat prior to the testing but it is not recommended to eat or drink very sweet things such as pop, juice, candy or dessert type foods.  Eat a high protein, low carb meals prior to testing.    If you have any questions please call:    [facility]    [phone]

## 2020-12-27 NOTE — PROGRESS NOTES
Feels well overall.   Fetal movement: positive   Denies loss of fluid/vb/contractions  Anatomy ultrasound results discussed; to be reviewed by Masters DO, having a Girl, Placenta:  Not stated  Discussed preliminary ultrasound results and suboptimal heart images. We will probably advise another ultrasound in the next 2-4 weeks. She is aware that we will contact her with final recommendations within 24 hours.   Still considering transferring of care. She may opt for a home birth. She will notify us when she reaches her decision.   GCT visit between 24-28 weeks, handout provided, reminded of longer appointment  Round ligament pain and comfort measures reviewed  Return to clinic 4 weeks    Shady Gaytan, ALESSANDRA, APRN, CNM

## 2020-12-28 ENCOUNTER — ANCILLARY PROCEDURE (OUTPATIENT)
Dept: ULTRASOUND IMAGING | Facility: CLINIC | Age: 25
End: 2020-12-28
Attending: ADVANCED PRACTICE MIDWIFE
Payer: COMMERCIAL

## 2020-12-28 ENCOUNTER — MYC MEDICAL ADVICE (OUTPATIENT)
Dept: MIDWIFE SERVICES | Facility: CLINIC | Age: 25
End: 2020-12-28

## 2020-12-28 ENCOUNTER — PRENATAL OFFICE VISIT (OUTPATIENT)
Dept: MIDWIFE SERVICES | Facility: CLINIC | Age: 25
End: 2020-12-28
Attending: ADVANCED PRACTICE MIDWIFE
Payer: COMMERCIAL

## 2020-12-28 VITALS — DIASTOLIC BLOOD PRESSURE: 58 MMHG | WEIGHT: 156 LBS | SYSTOLIC BLOOD PRESSURE: 118 MMHG | BODY MASS INDEX: 24.99 KG/M2

## 2020-12-28 DIAGNOSIS — Z34.02 ENCOUNTER FOR SUPERVISION OF NORMAL FIRST PREGNANCY IN SECOND TRIMESTER: ICD-10-CM

## 2020-12-28 DIAGNOSIS — Z34.02 ENCOUNTER FOR SUPERVISION OF NORMAL FIRST PREGNANCY IN SECOND TRIMESTER: Primary | ICD-10-CM

## 2020-12-28 DIAGNOSIS — Z36.89 ENCOUNTER FOR FETAL ANATOMIC SURVEY: ICD-10-CM

## 2020-12-28 DIAGNOSIS — O28.3 ABNORMAL FETAL ULTRASOUND: Primary | ICD-10-CM

## 2020-12-28 PROCEDURE — 99207 PR PRENATAL VISIT: CPT | Performed by: ADVANCED PRACTICE MIDWIFE

## 2020-12-28 PROCEDURE — 76805 OB US >/= 14 WKS SNGL FETUS: CPT | Performed by: OBSTETRICS & GYNECOLOGY

## 2020-12-28 NOTE — RESULT ENCOUNTER NOTE
Cameron Daley,    As we discussed in clinic, it is advised that we repeat the ultrasound in 2 weeks to look at the babe's heart since we were unable to see all of it in this last ultrasound. .Please make another appointment with us and ultrasound for 2 weeks from now. Please contact us with any questions or concerns.     Thank you,  Shady Gaytan, ALESSANDRA, APRN, CNM

## 2020-12-28 NOTE — TELEPHONE ENCOUNTER
Per appt today with Shady epperson 12/28/2020:  Feels well overall.   Fetal movement: positive   Denies loss of fluid/vb/contractions  Anatomy ultrasound results discussed; to be reviewed by Masters , having a Girl, Placenta:  Not stated  Discussed preliminary ultrasound results and suboptimal heart images. We will probably advise another ultrasound in the next 2-4 weeks. She is aware that we will contact her with final recommendations within 24 hours.   Still considering transferring of care. She may opt for a home birth. She will notify us when she reaches her decision.   GCT visit between 24-28 weeks, handout provided, reminded of longer appointment  Round ligament pain and comfort measures reviewed  Return to clinic 4 weeks    Per US on 12/28/2020:  Impression:    Single viable fetus with normal growth.  Anatomy survey appears normal, however, the views of the heart are sub-optimal and should be repeated in 2 weeks. An isolated finding of an echogenic intracardiac focus is observed.She has had normal cell-free DNA testing. This finding in an otherwise low risk woman does not confer an increased risk of fetal aneuploidy. No further follow up of this is needed.  Fetal presentation is cephalic, placenta is posterior.   Fetal anomalies may be present but not dectected.   Sabrina Pino, DO

## 2021-01-11 ENCOUNTER — ANCILLARY PROCEDURE (OUTPATIENT)
Dept: ULTRASOUND IMAGING | Facility: CLINIC | Age: 26
End: 2021-01-11
Attending: ADVANCED PRACTICE MIDWIFE
Payer: COMMERCIAL

## 2021-01-11 ENCOUNTER — PRENATAL OFFICE VISIT (OUTPATIENT)
Dept: MIDWIFE SERVICES | Facility: CLINIC | Age: 26
End: 2021-01-11
Attending: ADVANCED PRACTICE MIDWIFE
Payer: COMMERCIAL

## 2021-01-11 VITALS — DIASTOLIC BLOOD PRESSURE: 54 MMHG | BODY MASS INDEX: 25.76 KG/M2 | WEIGHT: 160.8 LBS | SYSTOLIC BLOOD PRESSURE: 104 MMHG

## 2021-01-11 DIAGNOSIS — O28.3 ABNORMAL FETAL ULTRASOUND: ICD-10-CM

## 2021-01-11 DIAGNOSIS — Z34.02 ENCOUNTER FOR SUPERVISION OF NORMAL FIRST PREGNANCY IN SECOND TRIMESTER: ICD-10-CM

## 2021-01-11 PROCEDURE — 76816 OB US FOLLOW-UP PER FETUS: CPT | Performed by: OBSTETRICS & GYNECOLOGY

## 2021-01-11 PROCEDURE — 99207 PR PRENATAL VISIT: CPT | Performed by: ADVANCED PRACTICE MIDWIFE

## 2021-01-11 PROCEDURE — 59425 ANTEPARTUM CARE ONLY: CPT | Performed by: ADVANCED PRACTICE MIDWIFE

## 2021-01-11 NOTE — RESULT ENCOUNTER NOTE
Cameron Daley,    Your ultrasound results are back and everything is great. No follow up needed. Please contact us with any questions or concerns.     Thank you,  Shady Gaytan, ALESSANDRA, APRN, CNM

## 2021-01-11 NOTE — PATIENT INSTRUCTIONS
Round Ligament Pain    Pregnancy can entail many normal discomforts. One of those discomforts may be round ligament pain. Round ligament pain occurs as your uterus and your baby grow and the muscles begin to stretch more in your abdomen. Round ligament pain is normal and not dangerous but can be very uncomfortable      Round ligament pain is typically described as an unpleasant sensation that ranges from a sharp knifelike pain to dull intermittent pain in the lower abdominal/suprapubic area of a pregnant woman      Virtually all pregnant women will experience this pain at some point during their pregnancies      It typically manifests between 16 and 20 weeks of pregnancy and can be incredibly bothersome especially for women who remain very active during their pregnancies       Please discuss with your midwife if you are having this type of pain; sometimes the pain can be associated with other medical conditions so it is important for us to assess you just to make sure    Comfort measures    There are certain things you can do to cope with round ligament pain and ease the discomfort you are having      Pregnancy support belt      Tylenol      Hot/ice packs      Baths       Exercise such as yoga and swimming that help stretch muscles      Prenatal massage      Reflexology to waist and pelvic joints       Positioning such as side-lying and hands and knees, make sure your abdomen is  well supported with pillows while doing different positions      Calcium Rich Foods    All premenopausal or women of child-bearing age need to get at least 1000 mg of calcium per day from diet and/or supplementation. Post menopausal women should get at least 1200 mg from diet and/or supplementation.    Food     Amount   Calcium (mg)  Dairy  Yogurt     1 cup   400   Ice Cream/Frozen yogurt 1/2 cup  100  Milk 1% or 2%   1 cup   300  Cheese   1 oz    195-335   Cottage cheese 2%  1 cup   155  Fruits  Orange   1 medium  60  Pear    1  medium  19  Raisins    1/4 cup  18  Vegetables-fresh and/or cooked  Broccoli   1/2 cup  36  Bok So   1/2 cup  79  Petra greens   1/2 cup  15  Carrots    1 medium  19  Iceberg lettuce  4 leaves  16  Nuts, Beans, Seeds  Canned baked beans   1/2 cup  100  Canned red kidney beans 1/2 cup  25-80  Canned navy beans  1/2 cup  64  Tofu with calcium sulfate  1/2 cup  150  Soybeans   1 cup   175  Soy milk    1 cup   10  Almonds    1/4 cup  74  Protein  Foster   3 oz   181  Tuna, canned   3 oz   10  Turkey breast   3.5 oz   10  Egg (large)   1 egg   27  Peanut Butter   2 tbsp   11  Beef     3 oz   9  Chicken   1 leg   15  Grain  Amaranth (uncooked)  1 cup   298  Corn tortilla    1 medium  42  Rice (cooked)   1/2 cup  20  Waffle (frozen ~7in)  1   179  Bagel    1   23  Calcium fortified foods/drinks  Orange juice   1 cup   300  Cereal + milk   3/4 cup + 1/2 cup 400  Rice milk   1 cup   300  Lactaid milk    1 cup         GESTATIONAL DIABETES SCREENING    All pregnant women are screened at least once for diabetes as part of their prenatal care. A woman has gestational diabetes if she has high blood sugars for the first time during pregnancy.      Diabetes can harm your health and the health of your baby.  But if we find the diabetes early in pregnancy we will watch your health closely and prevent further problems.       We will check for gestational diabetes during your visit between 24-28 weeks visit. Please note you can not do this prior to 24 weeks of gestation.      Plan to spend about an hour at the clinic.  When you check in let us know that you will be having your diabetes screening that day.       We will give you a 50 gram glucose drink that you have 5 minutes to consume.  Exactly one hour later you will have draw blood from your arm to check your blood sugar level.      We will call you to let you know if your results are normal.  If the results are normal no more testing will be needed.  If your results are not  normal we will discuss follow up testing with you.        You may eat prior to the testing but it is not recommended to eat or drink very sweet things such as pop, juice, candy or dessert type foods.  Eat a high protein, low carb meals prior to testing.    If you have any questions please call:    [facility]    [phone]

## 2021-01-11 NOTE — PROGRESS NOTES
Feels well  Had repeat US earlier this morning. Report has not been resulted yet. Will release through [employer] when available and discuss F/U if needed  Fetal movement: positive   Denies loss of fluid/vb/contractions  GCT visit between 24-28 weeks, handout provided, reminded of longer appointment  Tdap at next visit  Interested in water birth. Hep C at next visit    Return to clinic 4-6 weeks    Leisa BROUSSARD CNM

## 2021-01-15 ENCOUNTER — HEALTH MAINTENANCE LETTER (OUTPATIENT)
Age: 26
End: 2021-01-15

## 2021-02-25 ENCOUNTER — RECORDS - HEALTHEAST (OUTPATIENT)
Dept: LAB | Facility: CLINIC | Age: 26
End: 2021-02-25

## 2021-02-25 LAB
ERYTHROCYTE [DISTWIDTH] IN BLOOD BY AUTOMATED COUNT: 13.4 % (ref 11–14.5)
FASTING STATUS PATIENT QL REPORTED: NORMAL
GLUCOSE 1H P 50 G GLC PO SERPL-MCNC: 87 MG/DL (ref 70–139)
HCT VFR BLD AUTO: 33.5 % (ref 35–47)
HGB BLD-MCNC: 10.6 G/DL (ref 12–16)
MCH RBC QN AUTO: 28.7 PG (ref 27–34)
MCHC RBC AUTO-ENTMCNC: 31.6 G/DL (ref 32–36)
MCV RBC AUTO: 91 FL (ref 80–100)
PLATELET # BLD AUTO: 181 THOU/UL (ref 140–440)
PMV BLD AUTO: 11.2 FL (ref 8.5–12.5)
RBC # BLD AUTO: 3.69 MILL/UL (ref 3.8–5.4)
WBC: 7.9 THOU/UL (ref 4–11)

## 2021-02-26 LAB
25(OH)D3 SERPL-MCNC: 24.7 NG/ML (ref 30–80)
BACTERIA SPEC CULT: NO GROWTH

## 2021-04-30 ENCOUNTER — RECORDS - HEALTHEAST (OUTPATIENT)
Dept: LAB | Facility: CLINIC | Age: 26
End: 2021-04-30

## 2021-04-30 LAB
ERYTHROCYTE [DISTWIDTH] IN BLOOD BY AUTOMATED COUNT: 15.1 % (ref 11–14.5)
FERRITIN SERPL-MCNC: 8 NG/ML (ref 10–130)
HCT VFR BLD AUTO: 36 % (ref 35–47)
HGB BLD-MCNC: 11 G/DL (ref 12–16)
MCH RBC QN AUTO: 26.5 PG (ref 27–34)
MCHC RBC AUTO-ENTMCNC: 30.6 G/DL (ref 32–36)
MCV RBC AUTO: 87 FL (ref 80–100)
PLATELET # BLD AUTO: 198 THOU/UL (ref 140–440)
PMV BLD AUTO: 11.5 FL (ref 8.5–12.5)
RBC # BLD AUTO: 4.15 MILL/UL (ref 3.8–5.4)
WBC: 7.9 THOU/UL (ref 4–11)

## 2021-05-01 LAB
ALLERGIC TO PENICILLIN: NO
BACTERIA SPEC CULT: NO GROWTH
GP B STREP DNA SPEC QL NAA+PROBE: NEGATIVE

## 2021-05-27 ENCOUNTER — RECORDS - HEALTHEAST (OUTPATIENT)
Dept: LAB | Facility: CLINIC | Age: 26
End: 2021-05-27

## 2021-05-27 LAB
PROLACTIN SERPL-MCNC: 195.8 NG/ML (ref 0–20)
T4 FREE SERPL-MCNC: 1.1 NG/DL (ref 0.7–1.8)
TSH SERPL DL<=0.005 MIU/L-ACNC: 0.72 UIU/ML (ref 0.3–5)

## 2021-05-28 LAB — T3 SERPL-MCNC: 106 NG/DL (ref 45–175)

## 2021-09-04 ENCOUNTER — HEALTH MAINTENANCE LETTER (OUTPATIENT)
Age: 26
End: 2021-09-04

## 2022-02-19 ENCOUNTER — HEALTH MAINTENANCE LETTER (OUTPATIENT)
Age: 27
End: 2022-02-19

## 2022-10-16 ENCOUNTER — HEALTH MAINTENANCE LETTER (OUTPATIENT)
Age: 27
End: 2022-10-16

## 2023-04-01 ENCOUNTER — HEALTH MAINTENANCE LETTER (OUTPATIENT)
Age: 28
End: 2023-04-01

## 2023-05-16 ENCOUNTER — TRANSFERRED RECORDS (OUTPATIENT)
Dept: HEALTH INFORMATION MANAGEMENT | Facility: CLINIC | Age: 28
End: 2023-05-16

## 2023-05-19 LAB
HEPATITIS B SURFACE ANTIGEN (EXTERNAL): NONREACTIVE
HEPATITIS C ANTIBODY (EXTERNAL): NONREACTIVE
HIV1+2 AB SERPL QL IA: NONREACTIVE
RUBELLA ANTIBODY IGG (EXTERNAL): NORMAL
TREPONEMA PALLIDUM ANTIBODY (EXTERNAL): NONREACTIVE

## 2023-09-26 PROCEDURE — 82947 ASSAY GLUCOSE BLOOD QUANT: CPT | Mod: ORL | Performed by: MIDWIFE, LAY

## 2023-09-26 PROCEDURE — 85025 COMPLETE CBC W/AUTO DIFF WBC: CPT | Mod: ORL | Performed by: MIDWIFE, LAY

## 2023-09-26 PROCEDURE — 81001 URINALYSIS AUTO W/SCOPE: CPT | Mod: ORL | Performed by: MIDWIFE, LAY

## 2023-09-26 PROCEDURE — 82728 ASSAY OF FERRITIN: CPT | Mod: ORL | Performed by: MIDWIFE, LAY

## 2023-09-27 ENCOUNTER — LAB REQUISITION (OUTPATIENT)
Dept: LAB | Facility: CLINIC | Age: 28
End: 2023-09-27
Payer: COMMERCIAL

## 2023-09-27 DIAGNOSIS — O99.019 ANEMIA COMPLICATING PREGNANCY, UNSPECIFIED TRIMESTER: ICD-10-CM

## 2023-09-27 DIAGNOSIS — Z34.90 ENCOUNTER FOR SUPERVISION OF NORMAL PREGNANCY, UNSPECIFIED, UNSPECIFIED TRIMESTER: ICD-10-CM

## 2023-09-27 DIAGNOSIS — Z3A.29 29 WEEKS GESTATION OF PREGNANCY: ICD-10-CM

## 2023-09-27 LAB
ALBUMIN UR-MCNC: NEGATIVE MG/DL
APPEARANCE UR: CLEAR
BACTERIA #/AREA URNS HPF: ABNORMAL /HPF
BILIRUB UR QL STRIP: NEGATIVE
COLOR UR AUTO: ABNORMAL
GLUCOSE UR STRIP-MCNC: NEGATIVE MG/DL
HGB UR QL STRIP: NEGATIVE
KETONES UR STRIP-MCNC: NEGATIVE MG/DL
LEUKOCYTE ESTERASE UR QL STRIP: NEGATIVE
MUCOUS THREADS #/AREA URNS LPF: PRESENT /LPF
NITRATE UR QL: NEGATIVE
PH UR STRIP: 7 [PH] (ref 5–7)
RBC URINE: 0 /HPF
SP GR UR STRIP: 1.01 (ref 1–1.03)
SQUAMOUS EPITHELIAL: 1 /HPF
TRANSITIONAL EPI: <1 /HPF
UROBILINOGEN UR STRIP-MCNC: NORMAL MG/DL
WBC URINE: 1 /HPF

## 2023-09-28 LAB
BASOPHILS # BLD AUTO: 0 10E3/UL (ref 0–0.2)
BASOPHILS NFR BLD AUTO: 0 %
EOSINOPHIL # BLD AUTO: 0 10E3/UL (ref 0–0.7)
EOSINOPHIL NFR BLD AUTO: 0 %
ERYTHROCYTE [DISTWIDTH] IN BLOOD BY AUTOMATED COUNT: 15.8 % (ref 10–15)
FERRITIN SERPL-MCNC: 6 NG/ML (ref 6–175)
GLUCOSE P FAST SERPL-MCNC: 106 MG/DL (ref 60–94)
HCT VFR BLD AUTO: 36.1 % (ref 35–47)
HGB BLD-MCNC: 10 G/DL (ref 11.7–15.7)
IMM GRANULOCYTES # BLD: 0 10E3/UL
IMM GRANULOCYTES NFR BLD: 0 %
LYMPHOCYTES # BLD AUTO: 1.4 10E3/UL (ref 0.8–5.3)
LYMPHOCYTES NFR BLD AUTO: 20 %
MCH RBC QN AUTO: 25.8 PG (ref 26.5–33)
MCHC RBC AUTO-ENTMCNC: 27.7 G/DL (ref 31.5–36.5)
MCV RBC AUTO: 93 FL (ref 78–100)
MONOCYTES # BLD AUTO: 0.4 10E3/UL (ref 0–1.3)
MONOCYTES NFR BLD AUTO: 5 %
NEUTROPHILS # BLD AUTO: 5.3 10E3/UL (ref 1.6–8.3)
NEUTROPHILS NFR BLD AUTO: 75 %
NRBC # BLD AUTO: 0 10E3/UL
NRBC BLD AUTO-RTO: 0 /100
PLATELET # BLD AUTO: 218 10E3/UL (ref 150–450)
RBC # BLD AUTO: 3.87 10E6/UL (ref 3.8–5.2)
WBC # BLD AUTO: 7.2 10E3/UL (ref 4–11)

## 2023-11-14 ENCOUNTER — TRANSCRIBE ORDERS (OUTPATIENT)
Dept: OTHER | Age: 28
End: 2023-11-14

## 2023-11-14 DIAGNOSIS — D50.9 ANEMIA, IRON DEFICIENCY: Primary | ICD-10-CM

## 2023-11-16 NOTE — PROGRESS NOTES
SUBJECTIVE:                                                   Dionna Barger is a 28 year old female who presents to clinic today for the following health issue(s):  No chief complaint on file.      Additional information: anemia, iron deficiency /Needs iron infusion.    Patient is followed at [facility] for midwives and is planning on birth in [facility].  Her hemoglobin reportedly has been as low as 8 this pregnancy but currently is 10.2.  The rationale for being seen today is that she was advised that she should have a hemoglobin of 11 should she be wanting to have a home birth which she is planning.  She is currently taking iron twice daily.  When asked about the paleness of her skin she notes that that is her normal color even though it appears to be what we considered anemic.  She was advised by her midwives that she should have iron injections to bring her hemoglobin up to 11.    At this point as her hemoglobin is steadily going up a month ago it was 10 currently by a home health nurse it is 10.2 I suggest that she continue on her at least 1 a day iron tablets as well as a healthy diet and should she want to do more taking 2 would be reasonable under her circumstances.    Examination:  Baby heart tones are heard at 140 bpm  Uterine height is 37  Baby appears to be vertex  She is negative for edema    Assessment/plan pregnancy at 36+5 weeks.  She has a hemoglobin that has been in increasing and is on iron tablets would suggest that she continue the same and then follow-up with her midwives

## 2023-11-17 ENCOUNTER — OFFICE VISIT (OUTPATIENT)
Dept: OBGYN | Facility: CLINIC | Age: 28
End: 2023-11-17
Payer: COMMERCIAL

## 2023-11-17 VITALS
DIASTOLIC BLOOD PRESSURE: 60 MMHG | BODY MASS INDEX: 26.52 KG/M2 | HEIGHT: 66 IN | WEIGHT: 165 LBS | SYSTOLIC BLOOD PRESSURE: 110 MMHG

## 2023-11-17 DIAGNOSIS — Z34.93 NORMAL PREGNANCY, THIRD TRIMESTER: Primary | ICD-10-CM

## 2023-11-17 DIAGNOSIS — D50.8 OTHER IRON DEFICIENCY ANEMIA: ICD-10-CM

## 2023-11-17 PROCEDURE — 99207 PR PRENATAL VISIT: CPT | Performed by: OBSTETRICS & GYNECOLOGY

## 2023-11-17 RX ORDER — FAMOTIDINE 20 MG/1
20 TABLET, FILM COATED ORAL 2 TIMES DAILY
COMMUNITY

## 2023-11-28 ENCOUNTER — TRANSFERRED RECORDS (OUTPATIENT)
Dept: HEALTH INFORMATION MANAGEMENT | Facility: CLINIC | Age: 28
End: 2023-11-28
Payer: COMMERCIAL

## 2023-12-01 ENCOUNTER — TRANSFERRED RECORDS (OUTPATIENT)
Dept: HEALTH INFORMATION MANAGEMENT | Facility: CLINIC | Age: 28
End: 2023-12-01
Payer: COMMERCIAL

## 2023-12-01 ENCOUNTER — MEDICAL CORRESPONDENCE (OUTPATIENT)
Dept: HEALTH INFORMATION MANAGEMENT | Facility: CLINIC | Age: 28
End: 2023-12-01
Payer: COMMERCIAL

## 2023-12-01 LAB
GROUP B STREPTOCOCCUS (EXTERNAL): NEGATIVE
HEMATOCRIT (EXTERNAL): 28.9 % (ref 35–45)
HEMOGLOBIN (EXTERNAL): 9.1 G/DL (ref 11.7–15.5)
PLATELET COUNT (EXTERNAL): 239 10E3/UL (ref 140–400)
WBC COUNT (EXTERNAL): 7.6 10E3/UL (ref 3.8–10.8)

## 2023-12-03 ENCOUNTER — TELEPHONE (OUTPATIENT)
Dept: OBGYN | Facility: CLINIC | Age: 28
End: 2023-12-03
Payer: COMMERCIAL

## 2023-12-03 NOTE — CONFIDENTIAL NOTE
Call from [facility] about need to transfer this patient to plan delivery at [facility]. Edi is 39 wks and has a hgb of 9.1, which risks her out of home birth. Message sent to our  to get her scheduled for DO OB visit this week.  Marily Woodall, DNP, APRN, CNM, FACNM

## 2023-12-04 ENCOUNTER — MYC MEDICAL ADVICE (OUTPATIENT)
Dept: OBGYN | Facility: CLINIC | Age: 28
End: 2023-12-04

## 2023-12-04 ENCOUNTER — VIRTUAL VISIT (OUTPATIENT)
Dept: OBGYN | Facility: CLINIC | Age: 28
End: 2023-12-04
Attending: ADVANCED PRACTICE MIDWIFE
Payer: COMMERCIAL

## 2023-12-04 VITALS
BODY MASS INDEX: 25.91 KG/M2 | HEIGHT: 66 IN | HEART RATE: 91 BPM | WEIGHT: 161.2 LBS | SYSTOLIC BLOOD PRESSURE: 115 MMHG | DIASTOLIC BLOOD PRESSURE: 66 MMHG

## 2023-12-04 DIAGNOSIS — D50.8 IRON DEFICIENCY ANEMIA SECONDARY TO INADEQUATE DIETARY IRON INTAKE: ICD-10-CM

## 2023-12-04 DIAGNOSIS — Z34.90: Primary | ICD-10-CM

## 2023-12-04 DIAGNOSIS — N13.70 REFLUX, VESICOURETERAL: Primary | ICD-10-CM

## 2023-12-04 PROBLEM — Z34.00 SUPERVISION OF NORMAL FIRST PREGNANCY: Status: RESOLVED | Noted: 2020-10-07 | Resolved: 2023-12-04

## 2023-12-04 PROCEDURE — 99203 OFFICE O/P NEW LOW 30 MIN: CPT | Performed by: ADVANCED PRACTICE MIDWIFE

## 2023-12-04 PROCEDURE — 99213 OFFICE O/P EST LOW 20 MIN: CPT | Performed by: ADVANCED PRACTICE MIDWIFE

## 2023-12-04 RX ORDER — ALBUTEROL SULFATE 0.83 MG/ML
2.5 SOLUTION RESPIRATORY (INHALATION)
Status: CANCELLED | OUTPATIENT
Start: 2023-12-05

## 2023-12-04 RX ORDER — METHYLPREDNISOLONE SODIUM SUCCINATE 125 MG/2ML
125 INJECTION, POWDER, LYOPHILIZED, FOR SOLUTION INTRAMUSCULAR; INTRAVENOUS
Status: CANCELLED
Start: 2023-12-05

## 2023-12-04 RX ORDER — HEPARIN SODIUM,PORCINE 10 UNIT/ML
5-20 VIAL (ML) INTRAVENOUS DAILY PRN
Status: CANCELLED | OUTPATIENT
Start: 2023-12-05

## 2023-12-04 RX ORDER — EPINEPHRINE 1 MG/ML
0.3 INJECTION, SOLUTION, CONCENTRATE INTRAVENOUS EVERY 5 MIN PRN
Status: CANCELLED | OUTPATIENT
Start: 2023-12-05

## 2023-12-04 RX ORDER — DIPHENHYDRAMINE HYDROCHLORIDE 50 MG/ML
50 INJECTION INTRAMUSCULAR; INTRAVENOUS
Status: CANCELLED
Start: 2023-12-05

## 2023-12-04 RX ORDER — MEPERIDINE HYDROCHLORIDE 25 MG/ML
25 INJECTION INTRAMUSCULAR; INTRAVENOUS; SUBCUTANEOUS EVERY 30 MIN PRN
Status: CANCELLED | OUTPATIENT
Start: 2023-12-05

## 2023-12-04 RX ORDER — HEPARIN SODIUM (PORCINE) LOCK FLUSH IV SOLN 100 UNIT/ML 100 UNIT/ML
5 SOLUTION INTRAVENOUS
Status: CANCELLED | OUTPATIENT
Start: 2023-12-05

## 2023-12-04 RX ORDER — ALBUTEROL SULFATE 90 UG/1
1-2 AEROSOL, METERED RESPIRATORY (INHALATION)
Status: CANCELLED
Start: 2023-12-05

## 2023-12-04 NOTE — PATIENT INSTRUCTIONS
Thank you for trusting us with your care!     If you need to contact us for questions about:  Symptoms, Scheduling & Medical Questions; Non-urgent (2-3 day response) Gini message, Urgent (needing response today) [phone] (if after 3:30pm next day response)   Prescriptions: Please call your Pharmacy   Billing: [facility] [phone] or [facility]:[phone]

## 2023-12-04 NOTE — LETTER
2023       RE: Dionna Barger  [address] [address]     Dear Colleague,    Thank you for referring your patient, Dionna Barger, to the [facility] [place] at [facility]. Please see a copy of my visit note below.    JESSY RN Transfer of Care Intake note    28 year old female at 39w1d.    LMP: unknown.      Pregnancy is planned.    Patient is transferring care from: [facility] and has had 12 prenatal visits with this clinic (per patient report, not all records from [facility] available). Gestational age at first OB visit with this pregnancy was 10w2d per patient report. Records from [facility] STAT scanned to patient's media tab.    The reason the patient is transferring care is: low hemoglobin and need for iron infusions.    Partner/support person name and relationship - Jt, .        Symptoms since LMP include nausea and vomiting. Patient has tried these relief   measures: small frequent meals and increased rest.  -Nausea/vomiting resolved, was only issue in early pregnancy      OB HISTORY  OB History    Para Term  AB Living   2 1 1 0 0 1   SAB IAB Ectopic Multiple Live Births   0 0 0 0 1      # Outcome Date GA Lbr Anthony/2nd Weight Sex Delivery Anes PTL Lv   2 Current            1 Term 21 40w0d   F Vag-Spont  N PIEDAD      Obstetric Comments   : Home birth with [facility]      NONE          OB COMPLICATIONS  NONE  -First delivery , home birth with [facility]    PERSONAL/SOCIAL HISTORY  , lives with their family.  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  Job involves moderate activity and long periods of standing. Is now on maternity leave.  Her partner works as a .     MENTAL HEALTH HISTORY:  None    - Current Medications Reviewed   Current Outpatient Medications   Medication Sig Dispense Refill    famotidine  (PEPCID) 20 MG tablet Take 20 mg by mouth 2 times daily      Prenatal Vit-Fe Fumarate-FA (PNV PRENATAL PLUS MULTIVITAMIN) 27-1 MG TABS per tablet Take 1 tablet by mouth daily      UNABLE TO FIND MEDICATION NAME: 125 mg liquid iron 2x daily             - Co-morbids Reviewed   Past Medical History:   Diagnosis Date    History: Reflux, vesicoureteral        - Pre pregnancy weight 160 lbs, per patient report. Patient reports midwives didn't take weight at first visit, so this is an estimate. Height: 5 feet 6 inches.  pre pregnancy BMI 25.8 per patient reported height and weight    - Genetic/Infection questionnaire completed, risks include none. Pt completed genetic screening with [facility].   Pt  does not have a recent known exposure to Parvo or CMV so IgG/IgM testing WILL NOT be ordered.   - Labs already drawn this pregnancy include: Hgb, hematocrit, Plt, GBS, RPR, Hep B, Rubella, Hep C, HIV. Patient reports that she had glucose testing and gonorrhea/chlamydia, but unable to locate labs with records from [facility]. Pt to send screenshot of lab results if she can find them. Some glucose results in chart on 9/26/23.  - Ultrasound done at about 8 weeks gestation per patient report with [facility], records unavailable  Flu Vaccine.  Patient declined, do not offer - Pt reports she will get it through work  COVID Vaccine: Hasn't had COVID or vaccines  RHogam: None  Tdap: Hasn't received this pregnancy, last got booster in 10/2019  - Discussed expectations for routine prenatal care and scheduling.  - Discussed highlights from [facility] Expectant Family booklet on warning signs, safe pregnancy and prevention of infections diseases, nutrition and exercise.   - Additional items: None  - Reconciled and reviewed problem list  - Pt scheduled for NOB on 12/4/23    Kenzie Douglass RN

## 2023-12-04 NOTE — PROGRESS NOTES
Pregnancy consult at 39 wks for anemia, planning home birth  SUBJECTIVE  28 year old woman presents to clinic   at 39w1d by Estimated Date of Delivery: Dec 10, 2023 to discuss anemia and requests IV iron infusions.  Will likely have hospital birth if hgb remains below 10  - Feels well overall.   - Pt was receiving prenatal care from [facility].        - Current Medications    Current Outpatient Medications   Medication Sig Dispense Refill    famotidine (PEPCID) 20 MG tablet Take 20 mg by mouth 2 times daily      Prenatal Vit-Fe Fumarate-FA (PNV PRENATAL PLUS MULTIVITAMIN) 27-1 MG TABS per tablet Take 1 tablet by mouth daily      UNABLE TO FIND MEDICATION NAME: 125 mg liquid iron 2x daily           - Co-morbids    Past Medical History:   Diagnosis Date    History: Reflux, vesicoureteral          PERSONAL/SOCIAL HISTORY  Partner is involved,      lives with their family.        Past History:  Her past medical history   Past Medical History:   Diagnosis Date    History: Reflux, vesicoureteral    .   Her past pregnancies have been uncomplicated  Since her last LMP she denies use of alcohol, tobacco and street drugs.  HISTORY:  Family History   Problem Relation Age of Onset    No Known Problems Mother     Alcoholism Father     Cerebrovascular Disease Maternal Grandmother     Heart Failure Maternal Grandfather     Kidney Cancer Paternal Grandmother     Cancer Paternal Grandfather         unknown which type    Diabetes Type 2  Paternal Grandfather     No Known Problems Sister     No Known Problems Daughter      Social History     Socioeconomic History    Marital status:      Spouse name: Jt    Number of children: 1    Years of education: 14    Highest education level: Associate degree: academic program   Tobacco Use    Smoking status: Never     Passive exposure: Past (Parents used to smoke outside)    Smokeless tobacco: Never   Vaping Use    Vaping Use: Never used   Substance and Sexual  "Activity    Alcohol use: Not Currently     Comment: pregnancy    Drug use: Never    Sexual activity: Yes     Partners: Male     Current Outpatient Medications   Medication Sig    famotidine (PEPCID) 20 MG tablet Take 20 mg by mouth 2 times daily    Prenatal Vit-Fe Fumarate-FA (PNV PRENATAL PLUS MULTIVITAMIN) 27-1 MG TABS per tablet Take 1 tablet by mouth daily    UNABLE TO FIND MEDICATION NAME: 125 mg liquid iron 2x daily     No current facility-administered medications for this visit.     Allergies   Allergen Reactions    Adhesive Tape      Redness/Itchiness     Sulfa Antibiotics Other (See Comments)     Chest Pain       ============================================  MEDICAL HISTORY  Past Medical History:   Diagnosis Date    History: Reflux, vesicoureteral      Past Surgical History:   Procedure Laterality Date    ARTHROSCOPIC RECONSTRUCTION ANTERIOR CRUCIATE LIGAMENT Right 11/10/2014    Procedure: ARTHROSCOPIC RECONSTRUCTION ANTERIOR CRUCIATE LIGAMENT;  Surgeon: Toribio Reynaga MD;  Location: [facility] OR    [facility] REPAIR OF URETER  08    deflux        OB History    Para Term  AB Living   2 1 1 0 0 1   SAB IAB Ectopic Multiple Live Births   0 0 0 0 1      # Outcome Date GA Lbr Anthony/2nd Weight Sex Delivery Anes PTL Lv   2 Current            1 Term 21 40w0d   F Vag-Spont  N PIEDAD      Obstetric Comments   : Home birth with [facility]      NONE            ROS: 10 point ROS neg other than the symptoms noted above in the HPI.    Objective  /66   Pulse 91   Ht 1.676 m (5' 6\")   Wt 73.1 kg (161 lb 3.2 oz)   LMP  (LMP Unknown)   BMI 26.02 kg/m     EXAM:  GENERAL:  Pleasant pregnant female, alert, cooperative  and well groomed.  SKIN:  Warm and dry, without lesions or rashes  HEAD: Symmetrical features.    ABDOMEN: Soft without masses , tenderness or organomegaly.  No CVA tenderness.  Uterus palpable at size equal to dates.  No scars noted.. Fetal heart tones present.  MUSCULOSKELETAL:  " Full range of motion  EXTREMITIES:  No edema. No significant varicosities.     Lab Results   Component Value Date    PAP NIL 10/18/2019        Assessment/Plan  28 year old , 39w1d weeks of pregnancy with KARLA of Dec 10, 2023   (Z34.90) Encounter for suprvsn of normal pregnancy, unsp trimester  (primary encounter diagnosis)      (D50.8) Iron deficiency anemia secondary to inadequate dietary iron intake      No orders of the defined types were placed in this encounter.      - Oriented to Practice, types of care, and how to reach a provider.  Pt hopes for home birth  She knows our team is available should she chose to come to [facility]    Orders placed for venfor iron infusions x 3  40 minutes spent on the date of the encounter doing chart review, history and exam, documentation and further activities per the note.    Marily Woodall, DNP, APRN, CNM, FACNM      Marily Woodall, APRN CNM

## 2023-12-04 NOTE — PATIENT INSTRUCTIONS
Thank you for trusting us with your care!     If you need to contact us for questions about:  Symptoms, Scheduling & Medical Questions; Non-urgent (2-3 day response) Eloisavernapraveena message, Urgent (needing response today) [phone] (if after 3:30pm next day response)   Prescriptions: Please call your Pharmacy   Billing: [facility]oni [phone] or [facility]:[phone]  Week 38 of Your Pregnancy: Care Instructions  Believe it or not, your baby is almost here. You may notice how your baby responds to sounds, warmth, cold, and light. You may even know what kind of music your baby likes.    Even if you expect a vaginal birth, it's a good idea to learn about  section ().  is the delivery of a baby through a cut (incision) in your belly. It's a major surgery, so it has more risks than a vaginal birth.   During the first 2 weeks after the birth, limit visitors. Don't allow visitors who have colds or infections. Ask visitors to wash their hands before they touch your baby. And never let anyone smoke around your baby.     Know about unplanned C-sections.  Reasons for an unplanned  include labor that slows or stops, signs of distress in your baby, and high blood pressure or other problems for you.     Know about planned C-sections.  If your baby isn't in a head-down position for delivery (breech position), your doctor may plan a . Or you may have a planned  if you're having twins or more.     Get as much help as you can while you're in the hospital.  You can learn about feeding, diapering, and bathing your baby.     Talk to your doctor or midwife about how to care for the umbilical cord stump.  If your baby will be circumcised, also ask about how to care for that.     Ask friends or family for help, as you need it.  If you can, have another adult in your home for at least 2 or 3 days after the birth.     Try to nap when your baby naps.  This may be your best chance to get  "some sleep.     Watch for changes in your mental health.  For the first 1 to 2 weeks after birth, it's common to cry or feel sad or irritable. If these feelings last for more than 2 weeks, you may have postpartum depression. Ask your doctor for help. Postpartum depression can be treated.   Follow-up care is a key part of your treatment and safety. Be sure to make and go to all appointments, and call your doctor if you are having problems. It's also a good idea to know your test results and keep a list of the medicines you take.  Where can you learn more?  Go to https://www.ACTV8me.net/patiented  Enter B044 in the search box to learn more about \"Week 38 of Your Pregnancy: Care Instructions.\"  Current as of: 2023               Content Version: 13.8    3749-9718 [employer].   Care instructions adapted under license by your healthcare professional. If you have questions about a medical condition or this instruction, always ask your healthcare professional. [employer] disclaims any warranty or liability for your use of this information.      Week 39 of Your Pregnancy: Care Instructions    [place] babies can look different than what you see in pictures or movies. Their heads can be a strange shape right after birth. And they may have swollen eyes and red marks on their faces.   You can still get pregnant even if you are breastfeeding. If you don't want to get pregnant, talk to your doctor about birth control.   Tips for week 39 of pregnancy  If you plan to breastfeed, get prepared.     Continue to eat healthy foods.  Keep taking your prenatal vitamins during breastfeeding if your doctor recommends it.  Talk to your doctor before taking any medicines or supplements.  Choose the right birth control for you.     Intrauterine devices (IUDs) are placed in the uterus. Sometimes the IUD can be placed right after giving birth. They work for years.  Hormonal implants are placed under the " "skin of the arm. They also work for years.  Depo-Provera is a shot. You get it every 3 months.  Birth control pills can be used. They're taken every day.  Tubal ligation (tying your tubes) and vasectomy are surgeries. They're permanent.  Diaphragms, spermicide, and condoms must be used each time you have sex. If you used a diaphragm before, you should get refitted after the baby is born.  A birth control patch or ring can be used. These just can't be started until several weeks after you give birth.  Follow-up care is a key part of your treatment and safety. Be sure to make and go to all appointments, and call your doctor if you are having problems. It's also a good idea to know your test results and keep a list of the medicines you take.  Where can you learn more?  Go to https://www.Mobio.Dr. Z/patiented  Enter A811 in the search box to learn more about \"Week 39 of Your Pregnancy: Care Instructions.\"  Current as of: July 11, 2023               Content Version: 13.8    2313-1559 [employer].   Care instructions adapted under license by your healthcare professional. If you have questions about a medical condition or this instruction, always ask your healthcare professional. [employer] disclaims any warranty or liability for your use of this information.      Weeks 40 to 41 of Your Pregnancy: Care Instructions  By week 40, you've reached your due date. Your baby could be coming any day. Most babies born after their due dates are healthy. But you may have tests to make sure everything is okay. If you feel stressed, you could try a meditation exercise, such as guided imagery. It may help you relax.    If you are past 41 weeks, your doctor may measure the amount of fluid that surrounds your baby. They may also test your baby's movement and heart rate.   If you don't start labor on your own by 41 or 42 weeks, your doctor may want to start (induce) labor. If there are other concerns, your " "doctor may talk to you about a .   To start (induce) your labor, your doctor may do any of these things.    Place a narrow tube with a small balloon on the end (balloon catheter) into your cervix.  The doctor inflates the balloon. This helps your cervix open (dilate).     Sweep the membranes (separate the lining of the amniotic sac from the uterus).  This helps the uterus make a chemical that can start contractions.     \"Break your water\" (rupture the amniotic sac).  This may be done if your cervix has started to open.     Use medicines.  They may be used to soften the cervix or start contractions.   How to do guided imagery    Close your eyes, and take a few deep breaths. Picture a peaceful setting, such as a beach or a meadow. Add a winding path. Follow the path until you feel more and more relaxed.   Take a few minutes to breathe slowly and feel the calm. When you are ready, slowly take yourself back to the present. Count to 3, and open your eyes.   Follow-up care is a key part of your treatment and safety. Be sure to make and go to all appointments, and call your doctor if you are having problems. It's also a good idea to know your test results and keep a list of the medicines you take.  Where can you learn more?  Go to https://www.[employer].net/patiented  Enter T922 in the search box to learn more about \"Weeks 40 to 41 of Your Pregnancy: Care Instructions.\"  Current as of: 2023               Content Version: 13.8    7834-6483 [employer].   Care instructions adapted under license by your healthcare professional. If you have questions about a medical condition or this instruction, always ask your healthcare professional. [employer] disclaims any warranty or liability for your use of this information.      Labor Induction  What You Need to Know  What is labor induction?  In most cases, it is best to go into labor naturally. When labor does not start on its own, we may " "use medicine or other methods to start (induce) labor. This is called induction, which:  Helps the uterus contract  Thins, softens and opens the cervix (opening of the uterus)  When is induction used?  There are two types of induction.  Medical induction may be done to protect the health of the parent or baby if:  There are medical concerns for you or your baby.  You haven't had your baby by 41 weeks.  Induction for non-medical reasons may be done at 39 weeks or later if:  You live far from the hospital.  You've been having contractions for many days.  You've given birth quickly in the past.   We will not perform an induction for non-medical reasons before 39 weeks. Studies show that babies born before 39 weeks may struggle with breathing, feeding, sleeping and staying warm. They are more likely to have health problems and may need to stay in the hospital longer. If we start your labor for medical reasons, the benefits will outweigh these risks. We will talk to you about your risks, benefits and alternatives (other options) before we start your labor.  How is induction done?  We may start your labor by doing one or more of the following:  We may need to help your cervix soften and open (sometimes called \"ripening\") to prepare it for labor. There are two ways to do this:  Medicines--these may be in the form of pills that you swallow or medicines that are put into the vagina next to the cervix.  Mechanical--using either a single or double balloon. These are small balloons that are attached to tubes that go up inside the cervix. The balloons are then filled with water to put pressure on the cervix and help the cervix soften and open. Depending on the type of balloon used, you may be allowed to go home after it is placed.  After your cervix is ready:  We may give you medicine through an IV (a small tube placed in your vein). This medicine is called Pitocin. It helps the muscles of your uterus to contract.  We may make a " "small opening in your bag of water (the sac around your baby). This is called an amniotomy. Sometimes called \"breaking the water\". It may help your uterus contract and your cervix open.  What might happen if my labor is induced?  Some of this depends on how your labor is started and how your body responds. Your labor may be more complicated. You and your baby may need more medical treatments. In general:  You may not go into labor right away. If so, we may send you home with follow-up instructions.  It will be important to monitor you and your baby during the induction.  You may not be able to move around during labor. You will have two belts with monitors attached to your belly. These allow the nurse to watch your contractions and your baby's heart rate.  Your labor may take longer than if you went into labor on your own. It might take more than one day.  If you need cervical ripening, it is important to know that it may be many hours (even days) until delivery happens.  Cervical ripening can be slow and require several doses before your body is ready to labor.  A long labor may increase the risk of infection in mother and baby.  Your labor may not progress as planned. This could lead to a  birth.  Can I plan the date of my delivery?  After 39 weeks, you may ask about planning your delivery date. This is only an option if your body--and your baby--are ready. To find out, we will check your cervix and your baby's heart rate.   If you are ready to be induced, we will give you a date and time to come to the hospital. If many patients are in labor that day, we may need to start your labor at another time.  If you are not ready, we will not start your labor. It will be safer for your baby to come on its own.  What else do I need to know?  Before you have an induction, make sure you understand the reasons, risks and benefits. Ask your doctor or nurse-midwife:  Why do I need to be induced?  What are the risks to " my baby?  How will you start my labor?  How will you know if my baby is ready to be born?  How will you know if my body is ready to give birth?  Where can I get more information?  To learn more about induction, you may visit these websites:  The American College of Nurse-Midwives:  www.mymidwife.org  The American College of Obstetricians and Gynecologists: www.acog.org  Childbirth Connection:  www.childbirthconnection.org  March of Dimes: www.marchofdimes.[employer].  Association of Women's Health, Obstetrics, and  Nursing  www.tlmvlk5fwk.org/go-the-full-40&#047;  For informational purposes only. Not to replace the advice of your health care provider. Copyright   2008 [facility]. All rights reserved. Clinically reviewed by the  System Operations Leadership team. Symptom.ly [id number] - REV .

## 2023-12-04 NOTE — PROGRESS NOTES
JESSY RN Transfer of Care Intake note    28 year old female at 39w1d.    LMP: unknown.      Pregnancy is planned.    Patient is transferring care from: [facility] and has had 12 prenatal visits with this clinic (per patient report, not all records from [facility] available). Gestational age at first OB visit with this pregnancy was 10w2d per patient report. Records from [facility] STAT scanned to patient's media tab.    The reason the patient is transferring care is: low hemoglobin and need for iron infusions.    Partner/support person name and relationship - Jt, .        Symptoms since LMP include nausea and vomiting. Patient has tried these relief   measures: small frequent meals and increased rest.  -Nausea/vomiting resolved, was only issue in early pregnancy      OB HISTORY  OB History    Para Term  AB Living   2 1 1 0 0 1   SAB IAB Ectopic Multiple Live Births   0 0 0 0 1      # Outcome Date GA Lbr Anthony/2nd Weight Sex Delivery Anes PTL Lv   2 Current            1 Term 21 40w0d   F Vag-Spont  N PIEDAD      Obstetric Comments   : Home birth with [facility]      NONE          OB COMPLICATIONS  NONE  -First delivery , home birth with [facility]    PERSONAL/SOCIAL HISTORY  , lives with their family.  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  Job involves moderate activity and long periods of standing. Is now on maternity leave.  Her partner works as a .     MENTAL HEALTH HISTORY:  None    - Current Medications Reviewed   Current Outpatient Medications   Medication Sig Dispense Refill    famotidine (PEPCID) 20 MG tablet Take 20 mg by mouth 2 times daily      Prenatal Vit-Fe Fumarate-FA (PNV PRENATAL PLUS MULTIVITAMIN) 27-1 MG TABS per tablet Take 1 tablet by mouth daily      UNABLE TO FIND MEDICATION NAME: 125 mg liquid iron 2x daily             - Co-morbids Reviewed   Past Medical History:   Diagnosis Date     History: Reflux, vesicoureteral        - Pre pregnancy weight 160 lbs, per patient report. Patient reports midwives didn't take weight at first visit, so this is an estimate. Height: 5 feet 6 inches.  pre pregnancy BMI 25.8 per patient reported height and weight    - Genetic/Infection questionnaire completed, risks include none. Pt completed genetic screening with [facility].   Pt  does not have a recent known exposure to Parvo or CMV so IgG/IgM testing WILL NOT be ordered.   - Labs already drawn this pregnancy include: Hgb, hematocrit, Plt, GBS, RPR, Hep B, Rubella, Hep C, HIV. Patient reports that she had glucose testing and gonorrhea/chlamydia, but unable to locate labs with records from [facility]. Pt to send screenshot of lab results if she can find them. Some glucose results in chart on 9/26/23.  - Ultrasound done at about 8 weeks gestation per patient report with [facility], records unavailable  Flu Vaccine.  Patient declined, do not offer - Pt reports she will get it through work  COVID Vaccine: Hasn't had COVID or vaccines  RHogam: None  Tdap: Hasn't received this pregnancy, last got booster in 10/2019  - Discussed expectations for routine prenatal care and scheduling.  - Discussed highlights from The Expectant Family booklet on warning signs, safe pregnancy and prevention of infections diseases, nutrition and exercise.   - Additional items: None  - Reconciled and reviewed problem list  - Pt scheduled for NOB on 12/4/23    Kenzie Douglass RN

## 2023-12-08 ENCOUNTER — INFUSION THERAPY VISIT (OUTPATIENT)
Dept: INFUSION THERAPY | Facility: CLINIC | Age: 28
End: 2023-12-08
Attending: OBSTETRICS & GYNECOLOGY
Payer: COMMERCIAL

## 2023-12-08 VITALS
BODY MASS INDEX: 26.03 KG/M2 | RESPIRATION RATE: 16 BRPM | OXYGEN SATURATION: 98 % | TEMPERATURE: 98.3 F | HEART RATE: 70 BPM | DIASTOLIC BLOOD PRESSURE: 70 MMHG | SYSTOLIC BLOOD PRESSURE: 118 MMHG | WEIGHT: 161.3 LBS

## 2023-12-08 DIAGNOSIS — Z34.90: ICD-10-CM

## 2023-12-08 DIAGNOSIS — D50.8 IRON DEFICIENCY ANEMIA SECONDARY TO INADEQUATE DIETARY IRON INTAKE: Primary | ICD-10-CM

## 2023-12-08 PROCEDURE — 250N000011 HC RX IP 250 OP 636: Performed by: ADVANCED PRACTICE MIDWIFE

## 2023-12-08 PROCEDURE — 96365 THER/PROPH/DIAG IV INF INIT: CPT

## 2023-12-08 PROCEDURE — 258N000003 HC RX IP 258 OP 636: Performed by: ADVANCED PRACTICE MIDWIFE

## 2023-12-08 RX ORDER — EPINEPHRINE 1 MG/ML
0.3 INJECTION, SOLUTION INTRAMUSCULAR; SUBCUTANEOUS EVERY 5 MIN PRN
Status: CANCELLED | OUTPATIENT
Start: 2023-12-10

## 2023-12-08 RX ORDER — ALBUTEROL SULFATE 90 UG/1
1-2 AEROSOL, METERED RESPIRATORY (INHALATION)
Status: DISCONTINUED | OUTPATIENT
Start: 2023-12-08 | End: 2023-12-08 | Stop reason: HOSPADM

## 2023-12-08 RX ORDER — HEPARIN SODIUM (PORCINE) LOCK FLUSH IV SOLN 100 UNIT/ML 100 UNIT/ML
5 SOLUTION INTRAVENOUS
Status: CANCELLED | OUTPATIENT
Start: 2023-12-10

## 2023-12-08 RX ORDER — HEPARIN SODIUM,PORCINE 10 UNIT/ML
5-20 VIAL (ML) INTRAVENOUS DAILY PRN
Status: CANCELLED | OUTPATIENT
Start: 2023-12-10

## 2023-12-08 RX ORDER — ALBUTEROL SULFATE 90 UG/1
1-2 AEROSOL, METERED RESPIRATORY (INHALATION)
Status: CANCELLED
Start: 2023-12-10

## 2023-12-08 RX ORDER — DIPHENHYDRAMINE HYDROCHLORIDE 50 MG/ML
50 INJECTION INTRAMUSCULAR; INTRAVENOUS
Status: DISCONTINUED | OUTPATIENT
Start: 2023-12-08 | End: 2023-12-08 | Stop reason: HOSPADM

## 2023-12-08 RX ORDER — ALBUTEROL SULFATE 0.83 MG/ML
2.5 SOLUTION RESPIRATORY (INHALATION)
Status: DISCONTINUED | OUTPATIENT
Start: 2023-12-08 | End: 2023-12-08 | Stop reason: HOSPADM

## 2023-12-08 RX ORDER — MEPERIDINE HYDROCHLORIDE 50 MG/ML
25 INJECTION INTRAMUSCULAR; INTRAVENOUS; SUBCUTANEOUS EVERY 30 MIN PRN
Status: DISCONTINUED | OUTPATIENT
Start: 2023-12-08 | End: 2023-12-08 | Stop reason: HOSPADM

## 2023-12-08 RX ORDER — EPINEPHRINE 1 MG/ML
0.3 INJECTION, SOLUTION INTRAMUSCULAR; SUBCUTANEOUS EVERY 5 MIN PRN
Status: DISCONTINUED | OUTPATIENT
Start: 2023-12-08 | End: 2023-12-08 | Stop reason: HOSPADM

## 2023-12-08 RX ORDER — METHYLPREDNISOLONE SODIUM SUCCINATE 125 MG/2ML
125 INJECTION, POWDER, LYOPHILIZED, FOR SOLUTION INTRAMUSCULAR; INTRAVENOUS
Status: DISCONTINUED | OUTPATIENT
Start: 2023-12-08 | End: 2023-12-08 | Stop reason: HOSPADM

## 2023-12-08 RX ORDER — ALBUTEROL SULFATE 0.83 MG/ML
2.5 SOLUTION RESPIRATORY (INHALATION)
Status: CANCELLED | OUTPATIENT
Start: 2023-12-10

## 2023-12-08 RX ORDER — DIPHENHYDRAMINE HYDROCHLORIDE 50 MG/ML
50 INJECTION INTRAMUSCULAR; INTRAVENOUS
Status: CANCELLED
Start: 2023-12-10

## 2023-12-08 RX ORDER — MEPERIDINE HYDROCHLORIDE 50 MG/ML
25 INJECTION INTRAMUSCULAR; INTRAVENOUS; SUBCUTANEOUS EVERY 30 MIN PRN
Status: CANCELLED | OUTPATIENT
Start: 2023-12-10

## 2023-12-08 RX ORDER — METHYLPREDNISOLONE SODIUM SUCCINATE 125 MG/2ML
125 INJECTION, POWDER, LYOPHILIZED, FOR SOLUTION INTRAMUSCULAR; INTRAVENOUS
Status: CANCELLED
Start: 2023-12-10

## 2023-12-08 RX ADMIN — IRON SUCROSE 300 MG: 20 INJECTION, SOLUTION INTRAVENOUS at 08:52

## 2023-12-08 RX ADMIN — SODIUM CHLORIDE 250 ML: 9 INJECTION, SOLUTION INTRAVENOUS at 08:52

## 2023-12-08 NOTE — PROGRESS NOTES
"Infusion Nursing Note:  Dionna Barger presents today for Iron Sucrose 300mg #1 of 3.    Patient seen by provider today: No    Note: Pt arrives ambulatory to [facility]. Pt is 39wks pregnant and reports baby girl is active; pt reports \"due date\" is Kulwinder 12/10.  Pt denies any fatigue, dyspnea, or palpitations. Discussed process of today's visit, possible side effects of iron infusions, and when to notify nursing staff concerning sign of a reaction.    /70 (Patient Position: Sitting)   Pulse 70   Temp 98.3  F (36.8  C) (Oral)   Resp 16   Wt 73.2 kg (161 lb 4.8 oz)   LMP  (LMP Unknown)   SpO2 98%   BMI 26.03 kg/m      Intravenous Access:  Peripheral IV placed in left AC.    Treatment Conditions:  Hemoglobin   Date Value Ref Range Status   09/26/2023 10.0 (L) 11.7 - 15.7 g/dL Final   10/21/2020 12.5 11.7 - 15.7 g/dL Final     Post Infusion Assessment:  Patient tolerated infusion without incident.    Site patent and intact, free from redness, edema or discomfort.  No evidence of extravasations.  Access discontinued per protocol.     Discharge Plan:   Pt given AVS. Pt scheduled for next iron infusion 12/11.  Patient discharged in stable condition accompanied by: self.  Departure Mode: Ambulatory.      Darleen Carty RN    "

## 2023-12-13 ENCOUNTER — INFUSION THERAPY VISIT (OUTPATIENT)
Dept: INFUSION THERAPY | Facility: CLINIC | Age: 28
End: 2023-12-13
Attending: OBSTETRICS & GYNECOLOGY
Payer: COMMERCIAL

## 2023-12-13 VITALS
TEMPERATURE: 98 F | OXYGEN SATURATION: 100 % | DIASTOLIC BLOOD PRESSURE: 70 MMHG | RESPIRATION RATE: 16 BRPM | SYSTOLIC BLOOD PRESSURE: 119 MMHG | HEART RATE: 92 BPM

## 2023-12-13 DIAGNOSIS — Z34.90: Primary | ICD-10-CM

## 2023-12-13 DIAGNOSIS — D50.8 IRON DEFICIENCY ANEMIA SECONDARY TO INADEQUATE DIETARY IRON INTAKE: ICD-10-CM

## 2023-12-13 PROCEDURE — 96365 THER/PROPH/DIAG IV INF INIT: CPT

## 2023-12-13 PROCEDURE — 250N000011 HC RX IP 250 OP 636: Performed by: ADVANCED PRACTICE MIDWIFE

## 2023-12-13 PROCEDURE — 96366 THER/PROPH/DIAG IV INF ADDON: CPT

## 2023-12-13 PROCEDURE — 258N000003 HC RX IP 258 OP 636: Performed by: ADVANCED PRACTICE MIDWIFE

## 2023-12-13 RX ORDER — DIPHENHYDRAMINE HYDROCHLORIDE 50 MG/ML
50 INJECTION INTRAMUSCULAR; INTRAVENOUS
Status: DISCONTINUED | OUTPATIENT
Start: 2023-12-13 | End: 2023-12-13 | Stop reason: HOSPADM

## 2023-12-13 RX ORDER — METHYLPREDNISOLONE SODIUM SUCCINATE 125 MG/2ML
125 INJECTION, POWDER, LYOPHILIZED, FOR SOLUTION INTRAMUSCULAR; INTRAVENOUS
Start: 2023-12-14

## 2023-12-13 RX ORDER — ALBUTEROL SULFATE 90 UG/1
1-2 AEROSOL, METERED RESPIRATORY (INHALATION)
Start: 2023-12-14

## 2023-12-13 RX ORDER — DIPHENHYDRAMINE HYDROCHLORIDE 50 MG/ML
50 INJECTION INTRAMUSCULAR; INTRAVENOUS
Start: 2023-12-14

## 2023-12-13 RX ORDER — MEPERIDINE HYDROCHLORIDE 50 MG/ML
25 INJECTION INTRAMUSCULAR; INTRAVENOUS; SUBCUTANEOUS EVERY 30 MIN PRN
Status: DISCONTINUED | OUTPATIENT
Start: 2023-12-13 | End: 2023-12-13 | Stop reason: HOSPADM

## 2023-12-13 RX ORDER — EPINEPHRINE 1 MG/ML
0.3 INJECTION, SOLUTION INTRAMUSCULAR; SUBCUTANEOUS EVERY 5 MIN PRN
Status: DISCONTINUED | OUTPATIENT
Start: 2023-12-13 | End: 2023-12-13 | Stop reason: HOSPADM

## 2023-12-13 RX ORDER — METHYLPREDNISOLONE SODIUM SUCCINATE 125 MG/2ML
125 INJECTION, POWDER, LYOPHILIZED, FOR SOLUTION INTRAMUSCULAR; INTRAVENOUS
Status: DISCONTINUED | OUTPATIENT
Start: 2023-12-13 | End: 2023-12-13 | Stop reason: HOSPADM

## 2023-12-13 RX ORDER — HEPARIN SODIUM (PORCINE) LOCK FLUSH IV SOLN 100 UNIT/ML 100 UNIT/ML
5 SOLUTION INTRAVENOUS
OUTPATIENT
Start: 2023-12-14

## 2023-12-13 RX ORDER — EPINEPHRINE 1 MG/ML
0.3 INJECTION, SOLUTION INTRAMUSCULAR; SUBCUTANEOUS EVERY 5 MIN PRN
OUTPATIENT
Start: 2023-12-14

## 2023-12-13 RX ORDER — MEPERIDINE HYDROCHLORIDE 50 MG/ML
25 INJECTION INTRAMUSCULAR; INTRAVENOUS; SUBCUTANEOUS EVERY 30 MIN PRN
OUTPATIENT
Start: 2023-12-14

## 2023-12-13 RX ORDER — ALBUTEROL SULFATE 0.83 MG/ML
2.5 SOLUTION RESPIRATORY (INHALATION)
Status: DISCONTINUED | OUTPATIENT
Start: 2023-12-13 | End: 2023-12-13 | Stop reason: HOSPADM

## 2023-12-13 RX ORDER — ALBUTEROL SULFATE 90 UG/1
1-2 AEROSOL, METERED RESPIRATORY (INHALATION)
Status: DISCONTINUED | OUTPATIENT
Start: 2023-12-13 | End: 2023-12-13 | Stop reason: HOSPADM

## 2023-12-13 RX ORDER — HEPARIN SODIUM,PORCINE 10 UNIT/ML
5-20 VIAL (ML) INTRAVENOUS DAILY PRN
OUTPATIENT
Start: 2023-12-14

## 2023-12-13 RX ORDER — ALBUTEROL SULFATE 0.83 MG/ML
2.5 SOLUTION RESPIRATORY (INHALATION)
OUTPATIENT
Start: 2023-12-14

## 2023-12-13 RX ADMIN — SODIUM CHLORIDE 250 ML: 9 INJECTION, SOLUTION INTRAVENOUS at 09:18

## 2023-12-13 RX ADMIN — IRON SUCROSE 300 MG: 20 INJECTION, SOLUTION INTRAVENOUS at 09:57

## 2023-12-13 NOTE — PROGRESS NOTES
Infusion Nursing Note:  Dionna Barger presents today for Venofer, dose 2/3.    Patient seen by provider today: No   present during visit today: Not Applicable.    Note: Edi arrived ambulatory accompanied by her spouse and  baby for her second dose of venofer. She is feeling well today and reports no new concerns since her prior appointment. She tolerated her first venofer infusion well and did not notice any side effects afterwards. Plan of care reviewed and they have no questions.    Intravenous Access:  Peripheral IV placed.    Treatment Conditions:  Not Applicable.    Post Infusion Assessment:  Patient tolerated infusion without incident.  Blood return noted pre and post infusion.  Site patent and intact, free from redness, edema or discomfort.  Access discontinued per protocol.     Discharge Plan:   Copy of AVS reviewed with patient and/or family.  Patient will return  for next appointment.  Patient discharged in stable condition accompanied by: .  Departure Mode: Ambulatory.      Jacqui Summers RN

## 2024-06-01 ENCOUNTER — HEALTH MAINTENANCE LETTER (OUTPATIENT)
Age: 29
End: 2024-06-01

## 2025-06-14 ENCOUNTER — HEALTH MAINTENANCE LETTER (OUTPATIENT)
Age: 30
End: 2025-06-14